# Patient Record
Sex: MALE | Race: WHITE | NOT HISPANIC OR LATINO
[De-identification: names, ages, dates, MRNs, and addresses within clinical notes are randomized per-mention and may not be internally consistent; named-entity substitution may affect disease eponyms.]

---

## 2018-10-01 PROBLEM — Z00.00 ENCOUNTER FOR PREVENTIVE HEALTH EXAMINATION: Status: ACTIVE | Noted: 2018-10-01

## 2018-10-09 ENCOUNTER — APPOINTMENT (OUTPATIENT)
Dept: GASTROENTEROLOGY | Facility: CLINIC | Age: 73
End: 2018-10-09
Payer: COMMERCIAL

## 2018-10-09 VITALS
HEIGHT: 62 IN | TEMPERATURE: 98.9 F | SYSTOLIC BLOOD PRESSURE: 117 MMHG | BODY MASS INDEX: 22.45 KG/M2 | DIASTOLIC BLOOD PRESSURE: 73 MMHG | OXYGEN SATURATION: 98 % | HEART RATE: 90 BPM | WEIGHT: 122 LBS | RESPIRATION RATE: 16 BRPM

## 2018-10-09 DIAGNOSIS — Z12.11 ENCOUNTER FOR SCREENING FOR MALIGNANT NEOPLASM OF COLON: ICD-10-CM

## 2018-10-09 DIAGNOSIS — Z82.61 FAMILY HISTORY OF ARTHRITIS: ICD-10-CM

## 2018-10-09 DIAGNOSIS — K63.5 POLYP OF COLON: ICD-10-CM

## 2018-10-09 DIAGNOSIS — Z78.9 OTHER SPECIFIED HEALTH STATUS: ICD-10-CM

## 2018-10-09 DIAGNOSIS — Z86.39 PERSONAL HISTORY OF OTHER ENDOCRINE, NUTRITIONAL AND METABOLIC DISEASE: ICD-10-CM

## 2018-10-09 DIAGNOSIS — Z81.8 FAMILY HISTORY OF OTHER MENTAL AND BEHAVIORAL DISORDERS: ICD-10-CM

## 2018-10-09 PROCEDURE — 99243 OFF/OP CNSLTJ NEW/EST LOW 30: CPT

## 2018-11-21 ENCOUNTER — APPOINTMENT (OUTPATIENT)
Dept: GASTROENTEROLOGY | Facility: AMBULATORY MEDICAL SERVICES | Age: 73
End: 2018-11-21
Payer: COMMERCIAL

## 2018-11-21 PROCEDURE — 45378 DIAGNOSTIC COLONOSCOPY: CPT

## 2019-02-12 ENCOUNTER — APPOINTMENT (OUTPATIENT)
Dept: GASTROENTEROLOGY | Facility: CLINIC | Age: 74
End: 2019-02-12
Payer: COMMERCIAL

## 2019-02-12 VITALS
BODY MASS INDEX: 21.53 KG/M2 | DIASTOLIC BLOOD PRESSURE: 70 MMHG | WEIGHT: 117 LBS | OXYGEN SATURATION: 99 % | SYSTOLIC BLOOD PRESSURE: 100 MMHG | TEMPERATURE: 98.6 F | HEART RATE: 85 BPM | HEIGHT: 62 IN

## 2019-02-12 PROCEDURE — 99213 OFFICE O/P EST LOW 20 MIN: CPT

## 2019-02-12 NOTE — PHYSICAL EXAM
[General Appearance - Alert] : alert [General Appearance - In No Acute Distress] : in no acute distress [Neck Appearance] : the appearance of the neck was normal [Neck Cervical Mass (___cm)] : no neck mass was observed [Jugular Venous Distention Increased] : there was no jugular-venous distention [Thyroid Diffuse Enlargement] : the thyroid was not enlarged [Thyroid Nodule] : there were no palpable thyroid nodules [Auscultation Breath Sounds / Voice Sounds] : lungs were clear to auscultation bilaterally [Heart Rate And Rhythm] : heart rate was normal and rhythm regular [Heart Sounds] : normal S1 and S2 [Heart Sounds Gallop] : no gallops [Murmurs] : no murmurs [Heart Sounds Pericardial Friction Rub] : no pericardial rub [Bowel Sounds] : normal bowel sounds [Abdomen Soft] : soft [Abdomen Tenderness] : non-tender [] : no hepato-splenomegaly [Abdomen Mass (___ Cm)] : no abdominal mass palpated [Cervical Lymph Nodes Enlarged Posterior Bilaterally] : posterior cervical [Cervical Lymph Nodes Enlarged Anterior Bilaterally] : anterior cervical [Supraclavicular Lymph Nodes Enlarged Bilaterally] : supraclavicular [No CVA Tenderness] : no ~M costovertebral angle tenderness [No Spinal Tenderness] : no spinal tenderness

## 2019-02-12 NOTE — HISTORY OF PRESENT ILLNESS
[de-identified] : 74 year old man recently noted to develop anemia. Hist Hgb/Hct came down to 7.8/25.9 and he received an iron infusion. He denies rectal bleeding, melena or hematemesis. Recent colonoscopy in 11/21/18 was negative. He is referred for an EGD.

## 2019-02-23 ENCOUNTER — LABORATORY RESULT (OUTPATIENT)
Age: 74
End: 2019-02-23

## 2019-02-27 ENCOUNTER — APPOINTMENT (OUTPATIENT)
Dept: GASTROENTEROLOGY | Facility: AMBULATORY MEDICAL SERVICES | Age: 74
End: 2019-02-27
Payer: COMMERCIAL

## 2019-02-27 PROCEDURE — 43239 EGD BIOPSY SINGLE/MULTIPLE: CPT

## 2019-03-19 ENCOUNTER — APPOINTMENT (OUTPATIENT)
Dept: GASTROENTEROLOGY | Facility: CLINIC | Age: 74
End: 2019-03-19

## 2019-03-26 ENCOUNTER — MOBILE ON CALL (OUTPATIENT)
Age: 74
End: 2019-03-26

## 2019-04-09 ENCOUNTER — APPOINTMENT (OUTPATIENT)
Dept: GASTROENTEROLOGY | Facility: CLINIC | Age: 74
End: 2019-04-09

## 2019-04-22 ENCOUNTER — INPATIENT (INPATIENT)
Facility: HOSPITAL | Age: 74
LOS: 3 days | Discharge: HOME | DRG: 189 | End: 2019-04-26
Attending: INTERNAL MEDICINE | Admitting: HOSPITALIST
Payer: COMMERCIAL

## 2019-04-22 VITALS
HEART RATE: 84 BPM | OXYGEN SATURATION: 92 % | HEIGHT: 62 IN | WEIGHT: 110.01 LBS | SYSTOLIC BLOOD PRESSURE: 114 MMHG | RESPIRATION RATE: 18 BRPM | DIASTOLIC BLOOD PRESSURE: 60 MMHG

## 2019-04-22 DIAGNOSIS — R50.9 FEVER, UNSPECIFIED: ICD-10-CM

## 2019-04-22 LAB
ALBUMIN SERPL ELPH-MCNC: 2.6 G/DL — LOW (ref 3.3–5)
ALP SERPL-CCNC: 56 U/L — SIGNIFICANT CHANGE UP (ref 40–120)
ALT FLD-CCNC: 10 U/L — SIGNIFICANT CHANGE UP (ref 10–45)
ANION GAP SERPL CALC-SCNC: 12 MMOL/L — SIGNIFICANT CHANGE UP (ref 5–17)
ANION GAP SERPL CALC-SCNC: 13 MMOL/L — SIGNIFICANT CHANGE UP (ref 5–17)
APPEARANCE UR: CLEAR — SIGNIFICANT CHANGE UP
AST SERPL-CCNC: 20 U/L — SIGNIFICANT CHANGE UP (ref 10–40)
BACTERIA # UR AUTO: NEGATIVE — SIGNIFICANT CHANGE UP
BASOPHILS # BLD AUTO: 0 K/UL — SIGNIFICANT CHANGE UP (ref 0–0.2)
BASOPHILS NFR BLD AUTO: 0.4 % — SIGNIFICANT CHANGE UP (ref 0–2)
BILIRUB SERPL-MCNC: 0.2 MG/DL — SIGNIFICANT CHANGE UP (ref 0.2–1.2)
BILIRUB UR-MCNC: NEGATIVE — SIGNIFICANT CHANGE UP
BUN SERPL-MCNC: 11 MG/DL — SIGNIFICANT CHANGE UP (ref 7–23)
BUN SERPL-MCNC: 13 MG/DL — SIGNIFICANT CHANGE UP (ref 7–23)
CALCIUM SERPL-MCNC: 8.3 MG/DL — LOW (ref 8.4–10.5)
CALCIUM SERPL-MCNC: 8.4 MG/DL — SIGNIFICANT CHANGE UP (ref 8.4–10.5)
CHLORIDE SERPL-SCNC: 94 MMOL/L — LOW (ref 96–108)
CHLORIDE SERPL-SCNC: 97 MMOL/L — SIGNIFICANT CHANGE UP (ref 96–108)
CO2 SERPL-SCNC: 23 MMOL/L — SIGNIFICANT CHANGE UP (ref 22–31)
CO2 SERPL-SCNC: 24 MMOL/L — SIGNIFICANT CHANGE UP (ref 22–31)
COLOR SPEC: SIGNIFICANT CHANGE UP
CREAT SERPL-MCNC: 0.7 MG/DL — SIGNIFICANT CHANGE UP (ref 0.5–1.3)
CREAT SERPL-MCNC: 0.77 MG/DL — SIGNIFICANT CHANGE UP (ref 0.5–1.3)
DIFF PNL FLD: NEGATIVE — SIGNIFICANT CHANGE UP
EOSINOPHIL # BLD AUTO: 0 K/UL — SIGNIFICANT CHANGE UP (ref 0–0.5)
EOSINOPHIL NFR BLD AUTO: 0.1 % — SIGNIFICANT CHANGE UP (ref 0–6)
EPI CELLS # UR: 1 /HPF — SIGNIFICANT CHANGE UP
GAS PNL BLDV: SIGNIFICANT CHANGE UP
GLUCOSE SERPL-MCNC: 152 MG/DL — HIGH (ref 70–99)
GLUCOSE SERPL-MCNC: 266 MG/DL — HIGH (ref 70–99)
GLUCOSE UR QL: ABNORMAL
HCT VFR BLD CALC: 26.8 % — LOW (ref 39–50)
HGB BLD-MCNC: 8.7 G/DL — LOW (ref 13–17)
HYALINE CASTS # UR AUTO: 0 /LPF — SIGNIFICANT CHANGE UP (ref 0–2)
KETONES UR-MCNC: NEGATIVE — SIGNIFICANT CHANGE UP
LEUKOCYTE ESTERASE UR-ACNC: NEGATIVE — SIGNIFICANT CHANGE UP
LYMPHOCYTES # BLD AUTO: 0.5 K/UL — LOW (ref 1–3.3)
LYMPHOCYTES # BLD AUTO: 4.6 % — LOW (ref 13–44)
MAGNESIUM SERPL-MCNC: 1.6 MG/DL — SIGNIFICANT CHANGE UP (ref 1.6–2.6)
MCHC RBC-ENTMCNC: 26.2 PG — LOW (ref 27–34)
MCHC RBC-ENTMCNC: 32.5 GM/DL — SIGNIFICANT CHANGE UP (ref 32–36)
MCV RBC AUTO: 80.5 FL — SIGNIFICANT CHANGE UP (ref 80–100)
MONOCYTES # BLD AUTO: 0.9 K/UL — SIGNIFICANT CHANGE UP (ref 0–0.9)
MONOCYTES NFR BLD AUTO: 8.4 % — SIGNIFICANT CHANGE UP (ref 2–14)
NEUTROPHILS # BLD AUTO: 9 K/UL — HIGH (ref 1.8–7.4)
NEUTROPHILS NFR BLD AUTO: 86.6 % — HIGH (ref 43–77)
NITRITE UR-MCNC: NEGATIVE — SIGNIFICANT CHANGE UP
PH UR: 6 — SIGNIFICANT CHANGE UP (ref 5–8)
PHOSPHATE SERPL-MCNC: 3.8 MG/DL — SIGNIFICANT CHANGE UP (ref 2.5–4.5)
PLATELET # BLD AUTO: 428 K/UL — HIGH (ref 150–400)
POTASSIUM SERPL-MCNC: 2.9 MMOL/L — CRITICAL LOW (ref 3.5–5.3)
POTASSIUM SERPL-MCNC: 3.6 MMOL/L — SIGNIFICANT CHANGE UP (ref 3.5–5.3)
POTASSIUM SERPL-SCNC: 2.9 MMOL/L — CRITICAL LOW (ref 3.5–5.3)
POTASSIUM SERPL-SCNC: 3.6 MMOL/L — SIGNIFICANT CHANGE UP (ref 3.5–5.3)
PROT SERPL-MCNC: 6.6 G/DL — SIGNIFICANT CHANGE UP (ref 6–8.3)
PROT UR-MCNC: ABNORMAL
RBC # BLD: 3.33 M/UL — LOW (ref 4.2–5.8)
RBC # FLD: 17 % — HIGH (ref 10.3–14.5)
RBC CASTS # UR COMP ASSIST: 2 /HPF — SIGNIFICANT CHANGE UP (ref 0–4)
SODIUM SERPL-SCNC: 130 MMOL/L — LOW (ref 135–145)
SODIUM SERPL-SCNC: 133 MMOL/L — LOW (ref 135–145)
SP GR SPEC: 1.01 — SIGNIFICANT CHANGE UP (ref 1.01–1.02)
UROBILINOGEN FLD QL: NEGATIVE — SIGNIFICANT CHANGE UP
WBC # BLD: 10.4 K/UL — SIGNIFICANT CHANGE UP (ref 3.8–10.5)
WBC # FLD AUTO: 10.4 K/UL — SIGNIFICANT CHANGE UP (ref 3.8–10.5)
WBC UR QL: 3 /HPF — SIGNIFICANT CHANGE UP (ref 0–5)

## 2019-04-22 PROCEDURE — 99234 HOSP IP/OBS SM DT SF/LOW 45: CPT

## 2019-04-22 PROCEDURE — 93010 ELECTROCARDIOGRAM REPORT: CPT

## 2019-04-22 RX ORDER — DEXTROSE 50 % IN WATER 50 %
50 SYRINGE (ML) INTRAVENOUS ONCE
Qty: 0 | Refills: 0 | Status: COMPLETED | OUTPATIENT
Start: 2019-04-22 | End: 2019-04-22

## 2019-04-22 RX ORDER — POTASSIUM CHLORIDE 20 MEQ
10 PACKET (EA) ORAL ONCE
Qty: 0 | Refills: 0 | Status: COMPLETED | OUTPATIENT
Start: 2019-04-22 | End: 2019-04-22

## 2019-04-22 RX ORDER — AMIKACIN SULFATE 250 MG/ML
750 INJECTION, SOLUTION INTRAMUSCULAR; INTRAVENOUS ONCE
Qty: 0 | Refills: 0 | Status: COMPLETED | OUTPATIENT
Start: 2019-04-22 | End: 2019-04-22

## 2019-04-22 RX ORDER — DEXTROSE 50 % IN WATER 50 %
25 SYRINGE (ML) INTRAVENOUS ONCE
Qty: 0 | Refills: 0 | Status: DISCONTINUED | OUTPATIENT
Start: 2019-04-22 | End: 2019-04-26

## 2019-04-22 RX ORDER — DEXTROSE 50 % IN WATER 50 %
15 SYRINGE (ML) INTRAVENOUS ONCE
Qty: 0 | Refills: 0 | Status: DISCONTINUED | OUTPATIENT
Start: 2019-04-22 | End: 2019-04-26

## 2019-04-22 RX ORDER — DEXTROSE 50 % IN WATER 50 %
12.5 SYRINGE (ML) INTRAVENOUS ONCE
Qty: 0 | Refills: 0 | Status: DISCONTINUED | OUTPATIENT
Start: 2019-04-22 | End: 2019-04-26

## 2019-04-22 RX ORDER — SODIUM CHLORIDE 9 MG/ML
1000 INJECTION, SOLUTION INTRAVENOUS
Qty: 0 | Refills: 0 | Status: DISCONTINUED | OUTPATIENT
Start: 2019-04-22 | End: 2019-04-22

## 2019-04-22 RX ORDER — SODIUM CHLORIDE 9 MG/ML
1000 INJECTION, SOLUTION INTRAVENOUS
Qty: 0 | Refills: 0 | Status: DISCONTINUED | OUTPATIENT
Start: 2019-04-22 | End: 2019-04-26

## 2019-04-22 RX ORDER — POTASSIUM CHLORIDE 20 MEQ
40 PACKET (EA) ORAL ONCE
Qty: 0 | Refills: 0 | Status: COMPLETED | OUTPATIENT
Start: 2019-04-22 | End: 2019-04-22

## 2019-04-22 RX ORDER — GLUCAGON INJECTION, SOLUTION 0.5 MG/.1ML
1 INJECTION, SOLUTION SUBCUTANEOUS ONCE
Qty: 0 | Refills: 0 | Status: DISCONTINUED | OUTPATIENT
Start: 2019-04-22 | End: 2019-04-26

## 2019-04-22 RX ORDER — ERTAPENEM SODIUM 1 G/1
1000 INJECTION, POWDER, LYOPHILIZED, FOR SOLUTION INTRAMUSCULAR; INTRAVENOUS ONCE
Qty: 0 | Refills: 0 | Status: COMPLETED | OUTPATIENT
Start: 2019-04-22 | End: 2019-04-22

## 2019-04-22 RX ORDER — ETHAMBUTOL HYDROCHLORIDE 400 MG/1
400 TABLET, FILM COATED ORAL ONCE
Qty: 0 | Refills: 0 | Status: COMPLETED | OUTPATIENT
Start: 2019-04-22 | End: 2019-04-22

## 2019-04-22 RX ORDER — SODIUM CHLORIDE 9 MG/ML
3 INJECTION INTRAMUSCULAR; INTRAVENOUS; SUBCUTANEOUS EVERY 8 HOURS
Qty: 0 | Refills: 0 | Status: DISCONTINUED | OUTPATIENT
Start: 2019-04-22 | End: 2019-04-26

## 2019-04-22 RX ORDER — INSULIN GLARGINE 100 [IU]/ML
13 INJECTION, SOLUTION SUBCUTANEOUS AT BEDTIME
Qty: 0 | Refills: 0 | Status: DISCONTINUED | OUTPATIENT
Start: 2019-04-22 | End: 2019-04-24

## 2019-04-22 RX ORDER — ACETAMINOPHEN 500 MG
975 TABLET ORAL ONCE
Qty: 0 | Refills: 0 | Status: COMPLETED | OUTPATIENT
Start: 2019-04-22 | End: 2019-04-22

## 2019-04-22 RX ADMIN — Medication 50 MILLILITER(S): at 04:36

## 2019-04-22 RX ADMIN — INSULIN GLARGINE 13 UNIT(S): 100 INJECTION, SOLUTION SUBCUTANEOUS at 22:51

## 2019-04-22 RX ADMIN — Medication 975 MILLIGRAM(S): at 20:58

## 2019-04-22 RX ADMIN — Medication 100 MILLIEQUIVALENT(S): at 05:24

## 2019-04-22 RX ADMIN — Medication 40 MILLIEQUIVALENT(S): at 05:25

## 2019-04-22 RX ADMIN — AMIKACIN SULFATE 253 MILLIGRAM(S): 250 INJECTION, SOLUTION INTRAMUSCULAR; INTRAVENOUS at 14:32

## 2019-04-22 RX ADMIN — ERTAPENEM SODIUM 120 MILLIGRAM(S): 1 INJECTION, POWDER, LYOPHILIZED, FOR SOLUTION INTRAMUSCULAR; INTRAVENOUS at 15:50

## 2019-04-22 RX ADMIN — SODIUM CHLORIDE 3 MILLILITER(S): 9 INJECTION INTRAMUSCULAR; INTRAVENOUS; SUBCUTANEOUS at 16:04

## 2019-04-22 RX ADMIN — Medication 50 MILLILITER(S): at 06:17

## 2019-04-22 RX ADMIN — SODIUM CHLORIDE 3 MILLILITER(S): 9 INJECTION INTRAMUSCULAR; INTRAVENOUS; SUBCUTANEOUS at 22:33

## 2019-04-22 RX ADMIN — SODIUM CHLORIDE 100 MILLILITER(S): 9 INJECTION, SOLUTION INTRAVENOUS at 04:37

## 2019-04-22 RX ADMIN — ETHAMBUTOL HYDROCHLORIDE 400 MILLIGRAM(S): 400 TABLET, FILM COATED ORAL at 14:50

## 2019-04-22 RX ADMIN — SODIUM CHLORIDE 100 MILLILITER(S): 9 INJECTION, SOLUTION INTRAVENOUS at 05:50

## 2019-04-22 RX ADMIN — Medication 975 MILLIGRAM(S): at 20:20

## 2019-04-22 NOTE — ED CDU PROVIDER INITIAL DAY NOTE - MEDICAL DECISION MAKING DETAILS
73 y/o male with PMHx of MAC, DM, COPD, presenting to the ED with hypoglycemia, which corrected during observation period in the ED after multiple episodes of hypoglycemia. Will observe in CDU for POC fingerstick glucose to ensure euglycemia, Endocrinology evaluation, frequent reassessments.  ATTG: Dr. Sargent

## 2019-04-22 NOTE — ED PROVIDER NOTE - NS ED ROS FT
ROS: denies HA, dizziness, fevers/chills, nausea/vomiting, chest pain, SOB, diaphoresis, abdominal pain, diarrhea, joint pain, neuro deficits, dysuria/hematuria, rash    +decreased appetite, weakness ROS: denies HA, dizziness, fevers/chills, nausea/vomiting, chest pain, SOB, diaphoresis, abdominal pain, diarrhea, joint pain, neuro deficits, dysuria/hematuria, rash    +decreased appetite, weakness    All other symptoms negative

## 2019-04-22 NOTE — ED ADULT NURSE REASSESSMENT NOTE - NS ED NURSE REASSESS COMMENT FT1
12.00 Noon  Received pt from SUZANNE Wolf   pt is observed for Hypoglycemia.  received pt alert and responsive, oriented x4, denies any respiratory distress  ever chills CP SOB Headache N/V/D ..  IV in place, patent and free of signs of infiltration, pt has PICC line Too   V/S stable, pt afebrile, pt denies pain at this time. Pt educated on unit and unit rules, instructed patient to notify RN of any needed assistance, Pt verbalizes understanding, Call bell placed within reach. Safety maintained. Will continue to monitor.
18.00 Spo2 87% on RA  pt is placed on 2 LT od SPO2 NC
MICU at the bedside at this time.
Pt received from SUZANNE Dumas. Pt oriented to CDU & plan of care was discussed. Pt endorses feeling tired. Pt denies any SOB or any difficulty breathing. No use of accessory muscles noted. Pt lung sounds clear on examination. Pt maintained on pulse ox sating ~88% on room air. Pt placed back on 2L NC sating >97%. Pt aware of S&S of hypoglycemia and will notify RN or PA of any symptoms. Safety & comfort measures maintained. Call bell in reach. Will continue to monitor.

## 2019-04-22 NOTE — ED PROVIDER NOTE - PHYSICAL EXAMINATION
Gen: NAD, cachectic male  Head: NCAT  HEENT: PERRL, MMM, normal conjunctiva, anicteric, neck supple  Lung: CTAB, no adventitious sounds  CV: RRR, no murmurs, rubs or gallops  Abd: soft, NTND, no rebound or guarding, no CVAT  MSK: No edema, no visible deformities  Neuro: No focal neurologic deficits. CN II-XII grossly intact. 5/5 strength and normal sensation in all extremities.  Skin: Warm and dry, no evidence of rash  Psych: normal mood and affect

## 2019-04-22 NOTE — ED CDU PROVIDER DISPOSITION NOTE - ATTENDING CONTRIBUTION TO CARE
ED attending Dr Aneudy Ventura note:  Patient re-evaluated and doing well.  No acute issues at  this time.  Lab and radiology tests reviewed with patient and/or family.    I have personally performed a face to face diagnostic evaluation on this patient.  I have reviewed the ACP note and agree with the history, exam, and plan of care, except as noted.  History and Exam by me showing continual desats off o2 with films showing likely continual pna secondary to clint, id saw patient, not contagious but no consult note seen on meds, to admit for resp complaints. pt initially seen by micu while in the ed for continual hypoglycemia on d10 drip which was stopped and stabilized.

## 2019-04-22 NOTE — ED PROVIDER NOTE - CLINICAL SUMMARY MEDICAL DECISION MAKING FREE TEXT BOX
Melissa Moreno MD - Attending Physician: Pt here with hypoglycemia, unclear cause. On oral hypoglycemics, denies increased dose. FSG low despite recent D50 by EMS. Will give D50 now, start D5gtt, frequent FSG, labs, Xr, Ua for source

## 2019-04-22 NOTE — ED CDU PROVIDER INITIAL DAY NOTE - OBJECTIVE STATEMENT
73yo M with hx of MAC, COPD, diabetes (tresiba, victoza, metformin) presents with hypoglycemia. at 9pm, EMS was called for unresponsiveness, pt improved with D50 after FSG read "low", EMS left as patient declined transport. Was called again at 3am for same symptoms and his FS was "low" on repeat. Received D50 approx 20-30min ago with improvement of Glu to 189. On arrival FSG 59. Here, he is mentating well but continues to have dropping FS. Denies taking more pills than expected. Recent hospitalizations for MAC. Reports some poor po. No new fevers. No diff breathing, chest pain, decreased urination  PMD from [facility] Daphne Barrett    In the ED VSS.  Patient was given D50 x 2 with recurrent episodes of hypoglycemia.  Started on a D10 drip with improvement.  FS now ~150s off D10.  Plan to place in the CDu for q4h FS and endocrine consult.

## 2019-04-22 NOTE — ED PROVIDER NOTE - CARE PLAN
Principal Discharge DX:	Hypoglycemia Principal Discharge DX:	Hypoglycemia  Secondary Diagnosis:	Type 2 diabetes mellitus with complication, without long-term current use of insulin  Secondary Diagnosis:	Mycobacterium avium-intracellulare complex

## 2019-04-22 NOTE — ED CDU PROVIDER INITIAL DAY NOTE - PMH
Mycobacterium avium-intracellulare complex    Type 2 diabetes mellitus with complication, without long-term current use of insulin

## 2019-04-22 NOTE — ED PROVIDER NOTE - OBJECTIVE STATEMENT
75yo M with hx of diabetes (tresiba, victoza, metformin) presents with hypoglycemia. at 9pm, EMS was called for unresponsiveness, pt improved with D50, EMS left. Was called again at 3am for same symptoms and his FS was 50s both times. Here, he is mentating well but continues to have dropping FS. Deneis taking more pills. Recent hospitalizations for MAC. 75yo M with hx of diabetes (tresiba, victoza, metformin) presents with hypoglycemia. at 9pm, EMS was called for unresponsiveness, pt improved with D50, EMS left. Was called again at 3am for same symptoms and his FS was 50s both times. Here, he is mentating well but continues to have dropping FS. Deneis taking more pills. Recent hospitalizations for MAC.  [facility] 73[facility]x of diabetes (tresiba, victoza, metformin) presents with hypoglycemia. at 9pm, EMS was called for unresponsiveness, pt improved with D50, EMS left. Was called again at 3am for same symptoms and his FS was 50s both times. Here, he is mentating well but continues to have dropping FS. Deneis taking more pills. Recent hospitalizations for MAC.  PMD from [facility] Dr. uFnmi Barrett 73yo M with hx of diabetes (tresiba, victoza, metformin) presents with hypoglycemia. at 9pm, EMS was called for unresponsiveness, pt improved with D50 after FSG read "low", EMS left as patient declined transport. Was called again at 3am for same symptoms and his FS was "low" on repeat. Received D50 approx 20-30min ago with improvement of Glu to 189. On arrival FSG 59. Here, he is mentating well but continues to have dropping FS. Deneis taking more pills than expected. Recent hospitalizations for MAC. Reports some poor po. No new fevers. No diff breathing, chest pain, decreased urination  PMD from [facility] Dr. Funmi Barrett

## 2019-04-22 NOTE — ED ADULT NURSE NOTE - ED STAT RN HANDOFF DETAILS
Patient  is  transferred   to  CDU.  Report  is  given  .  Condition  is  stable.  Po  food  is  given  and  well tolerated.

## 2019-04-22 NOTE — ED ADULT NURSE REASSESSMENT NOTE - GENERAL PATIENT STATE
improvement verbalized/resting/sleeping/comfortable appearance/cooperative
improvement verbalized/family/SO at bedside/resting/sleeping/comfortable appearance/cooperative
cooperative/resting/sleeping/comfortable appearance

## 2019-04-22 NOTE — ED PROVIDER NOTE - SECONDARY DIAGNOSIS.
Type 2 diabetes mellitus with complication, without long-term current use of insulin Mycobacterium avium-intracellulare complex

## 2019-04-22 NOTE — ED CDU PROVIDER INITIAL DAY NOTE - PROGRESS NOTE DETAILS
Patient seen at bedside in NAD.  VSS.  Patient resting comfortably without complaints. FS stable.  Discussed case with endocrine, Dr. Jacobs, recommending decreased tresiba dose from 20u to 16u.  Spoke with patient's PMD/pulm, Dr. Conklin, who agrees with plan for observation.  Patient with endocrinology follow up on thurday.  -Tyree Parra PA-C CDU PROGRESS NOTE JANKI MICHELLE: Received pt at 1900 sign-out. Pt resting in stretcher in NAD. Case/plan reviewed. Vitals sig for Temp 102F ,O2 sat 88% on RA, /72, HR 78. Pt AOX3 Ambulatory around unit with steady gait. S1 S2 noted, RRR, lungs CTA b/l, BS x4 with soft, nontender abdomen. Pt reports having chills. Patient given Tylenol 975mg po and placed on 2 liters O2 via NC with saturation improving to 97%. c/d/w Dr. Ventura and Admitting team, Medicine admission placed. CDU PROGRESS NOTE PA VIVIANA: Temp 100.1F, patient saturating 98% on 2 liters supplemental O2. Patient without complaints, will continue to monitor. RVP and cultures pending.

## 2019-04-22 NOTE — ED ADULT NURSE REASSESSMENT NOTE - COMFORT CARE
po fluids offered/plan of care explained/meal provided
side rails up/treatment delay explained/wait time explained/ambulated to bathroom/plan of care explained/warm blanket provided/meal provided/darkened lights
plan of care explained/warm blanket provided/side rails up/treatment delay explained/wait time explained/darkened lights

## 2019-04-22 NOTE — CHART NOTE - NSCHARTNOTEFT_GEN_A_CORE
Endocrine consult was called for hypoglycemia but as noted in the MICU attg note, the patient went low due to decreased PO intake, thus a consult from endocrine is not needed, nor is a work-up for insulinoma. Can decrease his tresiba to 16 units sq qam and have him f/u with his outside endocrinologist upon discharge.   Discussed with JANKI Clements.  Kassi Jacobs MD  [phone]

## 2019-04-22 NOTE — ED PROVIDER NOTE - ATTENDING CONTRIBUTION TO CARE
Melissa Moreno MD - Attending Physician: I have personally seen and examined this patient with the resident/fellow.  I have fully participated in the care of this patient. I have reviewed all pertinent clinical information, including history, physical exam, plan and the Resident/Fellow’s note and agree except as noted. See MDM

## 2019-04-22 NOTE — ED PROVIDER NOTE - MDM PATIENT STATEMENT FOR ADDL TREATMENT
bilateral upper extremity ROM was WFL (within functional limits)/bilateral lower extremity ROM was WFL (within functional limits) Patient with one or more new problems requiring additional work-up/treatment.

## 2019-04-22 NOTE — ED ADULT NURSE NOTE - NSIMPLEMENTINTERV_GEN_ALL_ED
Implemented All Fall Risk Interventions:  [place] to call system. Call bell, personal items and telephone within reach. Instruct patient to call for assistance. Room bathroom lighting operational. Non-slip footwear when patient is off stretcher. Physically safe environment: no spills, clutter or unnecessary equipment. Stretcher in lowest position, wheels locked, appropriate side rails in place. Provide visual cue, wrist band, yellow gown, etc. Monitor gait and stability. Monitor for mental status changes and reorient to person, place, and time. Review medications for side effects contributing to fall risk. Reinforce activity limits and safety measures with patient and family.

## 2019-04-22 NOTE — ED PROVIDER NOTE - PROGRESS NOTE DETAILS
MICU called, will see patient as pt FS unstable, continues to drop despite D10, D50 boluses. MICU resident seen at bedside, will dispo per MICU. BG dropping despite initial D50. On D5 gtt. K low. Repleting now. Continue [id number] FSG until stable BG BG significantly lower despite D5 and po. Will place on D10, repeat FSG in 15 min, if still low will c/s MICU

## 2019-04-22 NOTE — CONSULT NOTE ADULT - ASSESSMENT
74M h/o TB, T2DM BIBA s/p hypoglycemic event, remained persistently hypoglycemic requiring multiple pushes of D50 and starting on D10 gtt.     #Hypoglycemia  Possibly in setting of combined diabetes medications (Tresiba- basal insulin, combined with Victoza in AM, and lack of lunch) but also consider infectious etiology.  - recommend sending blood cultures. Pt with known history of MAC infection as well, continue IV abx as per discharge from recent [facility] admission.   - hold insulin and oral diabetes medications for now.  - FS now improved to 140-->117 with D10 drip and additional D50 bolus.   - Continue frequent FS monitoring. If FS glucose drops further consider increasing D10 drip to 120 cc/hr.     Does not require ICU care at this time however re-consult if persistent hypoglycemia.      Radha Collier MD PGY2  MICU Resident  #[id number] 74M h/o TB, T2DM BIBA s/p hypoglycemic event, remained persistently hypoglycemic requiring multiple pushes of D50 and starting on D10 gtt.     #Hypoglycemia  Possibly in setting of combined diabetes medications (Tresiba- basal insulin, combined with Victoza in AM, and lack of lunch) but also consider infectious etiology.  - recommend sending blood cultures. Pt with known history of MAC infection as well, continue IV abx as per discharge from recent [facility] admission.   - hold insulin and oral diabetes medications for now.  - FS now improved to 140-->117 with D10 drip and additional D50 bolus.   - Continue frequent FS monitoring. If FS glucose drops further consider increasing D10 drip to 120 cc/hr.   - check c-peptide, TSH, A1c, CT A/P (consider insulinoma)  - consult endocrinology      Does not require ICU care at this time however re-consult if persistent hypoglycemia.      Radha Collier MD PGY2  MICU Resident  #[id number]

## 2019-04-22 NOTE — CONSULT NOTE ADULT - SUBJECTIVE AND OBJECTIVE BOX
CHIEF COMPLAINT:     HPI: 74M h/o T2DM BIBA s/p hypoglycemic event. Yesterday evening around 9 pm, EMS was called for unresponsiveness, upon arrival pt was found to be  hypoglycemic w/ BG in 50s. He was given 1amp of D50 and he improved. EMS left at that time. EMS was called again around 3AM for same symptoms. He  was again found to be hypoglycemic w/ BG in 50s. Pt was given another amp of D50 and brought into ED.    Pt denies taking any additional meds at home or increasing his dosage of current meds. Pt skipped lunch yesterday because he fell asleep while taking an abx infusion through his LUE PICC line. Of note, was recently hospitalized from   -  for MAC, PICC line in arm discharge on abx (Amikacin, Ertapenem, Ethambutol, Levaquin, Rifampin). For DM he takes tresiba, victoza, and  metformin. He takes tresiba and victoza in AM, reports usual dosage yesterday. Denies history of prior hypoglycemia.     In ED, he was afebrile, HR 60-80s, /60, RR 18, satting well on room air, he was speaking full sentences, AAO x 2-3. BG was low, required another D50  and pt was started on D10 + NS + KCl gtt at 100cc/hr. Despite gtt, his BG continued to be low (57). On D10 drip as well as additional D50 IV push, FS glucose improved to 140. Pt mentating well, denies other symptoms. States he is trying to eat but dislikes hospital food.       FAMILY HISTORY:  None known.     SOCIAL HISTORY:  Pt , lives with wife.  States currently working as a .     Allergies    No Known Allergies    Intolerances        HOME MEDICATIONS:    REVIEW OF SYSTEMS:  Constitutional: No reported fever or chills.   Eyes: No visual changes  ENT: No sore throat  CV: No chest pain, palpitations, or leg swelling  Resp: +Intermittent cough with productive sputum, ongoing. No wheezing.   GI: No nausea, vomiting, abdominal pain, or diarrhea.   : No dysuria.   Integumentary: has PICC line in left arm   Neurological: no focal weaknesses  Psychiatric: no reported confusion    [ ] All other systems negative  [ ] Unable to assess ROS because ________    OBJECTIVE:  ICU Vital Signs Last 24 Hrs  T(C): 36.8 (2019 06:17), Max: 37 (2019 04:28)  T(F): 98.3 (2019 06:17), Max: 98.6 (2019 04:28)  HR: 63 (2019 06:17) (63 - 84)  BP: 112/57 (2019 06:17) (97/53 - 114/60)  BP(mean): --  ABP: --  ABP(mean): --  RR: 18 (2019 06:17) (16 - 19)  SpO2: 100% (2019 06:17) (92% - 100%)        CAPILLARY BLOOD GLUCOSE      POCT Blood Glucose.: 117 mg/dL (2019 07:28)      PHYSICAL EXAM:  General: elderly gentleman in no acute distress, on nasal cannula  Respiratory: lungs clear to auscultation bilaterally; no wheezing, rales or rhonchi   Cardiovascular: regular rate and rhythm; no murmurs.   Abdomen: soft, nontender, nondistended, normal bowel sounds  Extremities: nonedematous  Skin: LUE PICC line; no rashes or lesions  Neurological: no focal neuro deficits  Psychiatry: A&O x 2-3, slightly confused, does not know all events that happened but is conversant, speaks in full sentences    HOSPITAL MEDICATIONS:  MEDICATIONS  (STANDING):  dextrose 10% + sodium chloride 0.9% 1000 milliLiter(s) (100 mL/Hr) IV Continuous <Continuous>    MEDICATIONS  (PRN):      LABS:                        8.7    10.4  )-----------( 428      ( 2019 04:28 )             26.8     04-22    133<L>  |  97  |  11  ----------------------------<  152<H>  3.6   |  23  |  0.70    Ca    8.4      2019 06:46  Phos  3.8     04-22  Mg     1.6     -    TPro  6.6  /  Alb  2.6<L>  /  TBili  0.2  /  DBili  x   /  AST  20  /  ALT  10  /  AlkPhos  56  04-22      Urinalysis Basic - ( 2019 06:58 )    Color: Light Yellow / Appearance: Clear / S.014 / pH: x  Gluc: x / Ketone: Negative  / Bili: Negative / Urobili: Negative   Blood: x / Protein: 30 mg/dL / Nitrite: Negative   Leuk Esterase: Negative / RBC: x / WBC x   Sq Epi: x / Non Sq Epi: x / Bacteria: x        Venous Blood Gas:   @ 04:28  7.50/32/63/25/94  VBG Lactate: 2.0      MICROBIOLOGY:     RADIOLOGY:  [ ] Reviewed and interpreted by me    EKG:

## 2019-04-22 NOTE — ED ADULT NURSE NOTE - OBJECTIVE STATEMENT
74y male with hx of tuberculosis, DM BIBEMS s/p hypoglycemic event. pt is alert and oriented x 4 and speaking coherently. As per EMS they were called to the home earlier tonight for an unresponsive episode. pt was given D50 and regained consciousness. EMS left home per MD over the phone. EMS was called back to home at 3am, and had another episode of being unresponsive. pt BG read a Lo on machine. pt  s/p amp of d50, Pt FS 59 @ [facility]. pt is alert and coherently speaking at this time. pt denies cp, sob, n/v, fevers. md truong completed. will reassess.

## 2019-04-22 NOTE — ED CDU PROVIDER DISPOSITION NOTE - CLINICAL COURSE
74M h/o T2DM BIBA s/p hypoglycemic event. Yesterday evening around 9 pm, EMS was called for unresponsiveness, upon arrival pt was found to be  hypoglycemic w/ BG in 50s. He was given 1amp of D50 and he improved. EMS left at that time. EMS was called again around 3AM for same symptoms. He  was again found to be hypoglycemic w/ BG in 50s. Pt was given another amp of D50 and brought into ED.  Pt denies taking any additional meds at home or increasing his dosage of current meds. Pt skipped lunch yesterday because he fell asleep while taking an abx infusion through his LUE PICC line. Of note, was recently hospitalized from 4/9  - 4/17 for MAC, PICC line in arm discharge on abx (Amikacin, Ertapenem, Ethambutol, Levaquin, Rifampin). For DM he takes tresiba, victoza, and  metformin. He takes tresiba and victoza in AM, reports usual dosage yesterday. Denies history of prior hypoglycemia.   In ED, he was afebrile, HR 60-80s, /60, RR 18, satting well on room air, he was speaking full sentences, AAO x 2-3. BG was low, required another D50  and pt was started on D10 + NS + KCl gtt at 100cc/hr. Despite gtt, his BG continued to be low (57). On D10 drip as well as additional D50 IV push, FS glucose improved to 140. Pt was evaluated by MICU but not accepted and sent to CDU for Hypoglycemia management/monitoring. In CDU, patient became hypoxic  to 88% on RA and febrile to 102F despite receiving antibiotic for MAC. Patient given Tylenol 975mg po and placed on 2 liters O2 via NC with saturation improving to 97%. c/d/w Dr. Ventura and Admitting team, Medicine admission placed.

## 2019-04-23 ENCOUNTER — TRANSCRIPTION ENCOUNTER (OUTPATIENT)
Age: 74
End: 2019-04-23

## 2019-04-23 DIAGNOSIS — Z79.899 OTHER LONG TERM (CURRENT) DRUG THERAPY: ICD-10-CM

## 2019-04-23 DIAGNOSIS — E16.2 HYPOGLYCEMIA, UNSPECIFIED: ICD-10-CM

## 2019-04-23 DIAGNOSIS — J96.01 ACUTE RESPIRATORY FAILURE WITH HYPOXIA: ICD-10-CM

## 2019-04-23 DIAGNOSIS — B34.0 ADENOVIRUS INFECTION, UNSPECIFIED: ICD-10-CM

## 2019-04-23 DIAGNOSIS — Z29.9 ENCOUNTER FOR PROPHYLACTIC MEASURES, UNSPECIFIED: ICD-10-CM

## 2019-04-23 DIAGNOSIS — Z71.89 OTHER SPECIFIED COUNSELING: ICD-10-CM

## 2019-04-23 DIAGNOSIS — A31.0 PULMONARY MYCOBACTERIAL INFECTION: ICD-10-CM

## 2019-04-23 DIAGNOSIS — E11.9 TYPE 2 DIABETES MELLITUS WITHOUT COMPLICATIONS: ICD-10-CM

## 2019-04-23 LAB
ANION GAP SERPL CALC-SCNC: 12 MMOL/L — SIGNIFICANT CHANGE UP (ref 5–17)
BLD GP AB SCN SERPL QL: NEGATIVE — SIGNIFICANT CHANGE UP
BUN SERPL-MCNC: 8 MG/DL — SIGNIFICANT CHANGE UP (ref 7–23)
CALCIUM SERPL-MCNC: 8.6 MG/DL — SIGNIFICANT CHANGE UP (ref 8.4–10.5)
CHLORIDE SERPL-SCNC: 95 MMOL/L — LOW (ref 96–108)
CO2 SERPL-SCNC: 24 MMOL/L — SIGNIFICANT CHANGE UP (ref 22–31)
CREAT SERPL-MCNC: 0.71 MG/DL — SIGNIFICANT CHANGE UP (ref 0.5–1.3)
FERRITIN SERPL-MCNC: 1184 NG/ML — HIGH (ref 30–400)
GLUCOSE BLDC GLUCOMTR-MCNC: 126 MG/DL — HIGH (ref 70–99)
GLUCOSE BLDC GLUCOMTR-MCNC: 140 MG/DL — HIGH (ref 70–99)
GLUCOSE BLDC GLUCOMTR-MCNC: 143 MG/DL — HIGH (ref 70–99)
GLUCOSE BLDC GLUCOMTR-MCNC: 148 MG/DL — HIGH (ref 70–99)
GLUCOSE BLDC GLUCOMTR-MCNC: 165 MG/DL — HIGH (ref 70–99)
GLUCOSE BLDC GLUCOMTR-MCNC: 203 MG/DL — HIGH (ref 70–99)
GLUCOSE SERPL-MCNC: 149 MG/DL — HIGH (ref 70–99)
HADV DNA SPEC QL NAA+PROBE: DETECTED
HCT VFR BLD CALC: 28.5 % — LOW (ref 39–50)
HCV AB S/CO SERPL IA: 0.11 S/CO — SIGNIFICANT CHANGE UP (ref 0–0.99)
HCV AB SERPL-IMP: SIGNIFICANT CHANGE UP
HGB BLD-MCNC: 8.9 G/DL — LOW (ref 13–17)
IRON SATN MFR SERPL: 21 UG/DL — LOW (ref 45–165)
MCHC RBC-ENTMCNC: 25 PG — LOW (ref 27–34)
MCHC RBC-ENTMCNC: 31.2 GM/DL — LOW (ref 32–36)
MCV RBC AUTO: 80.1 FL — SIGNIFICANT CHANGE UP (ref 80–100)
PLATELET # BLD AUTO: 410 K/UL — HIGH (ref 150–400)
POTASSIUM SERPL-MCNC: 3.9 MMOL/L — SIGNIFICANT CHANGE UP (ref 3.5–5.3)
POTASSIUM SERPL-SCNC: 3.9 MMOL/L — SIGNIFICANT CHANGE UP (ref 3.5–5.3)
RAPID RVP RESULT: DETECTED
RBC # BLD: 3.56 M/UL — LOW (ref 4.2–5.8)
RBC # FLD: 17.3 % — HIGH (ref 10.3–14.5)
RH IG SCN BLD-IMP: NEGATIVE — SIGNIFICANT CHANGE UP
SODIUM SERPL-SCNC: 131 MMOL/L — LOW (ref 135–145)
WBC # BLD: 7.48 K/UL — SIGNIFICANT CHANGE UP (ref 3.8–10.5)
WBC # FLD AUTO: 7.48 K/UL — SIGNIFICANT CHANGE UP (ref 3.8–10.5)

## 2019-04-23 PROCEDURE — 99255 IP/OBS CONSLTJ NEW/EST HI 80: CPT | Mod: GC

## 2019-04-23 PROCEDURE — 99223 1ST HOSP IP/OBS HIGH 75: CPT | Mod: GC,25

## 2019-04-23 PROCEDURE — 99497 ADVNCD CARE PLAN 30 MIN: CPT

## 2019-04-23 PROCEDURE — 12345: CPT | Mod: NC,GC

## 2019-04-23 RX ORDER — ERTAPENEM SODIUM 1 G/1
1000 INJECTION, POWDER, LYOPHILIZED, FOR SOLUTION INTRAMUSCULAR; INTRAVENOUS ONCE
Qty: 0 | Refills: 0 | Status: COMPLETED | OUTPATIENT
Start: 2019-04-23 | End: 2019-04-23

## 2019-04-23 RX ORDER — AMIKACIN SULFATE 250 MG/ML
400 INJECTION, SOLUTION INTRAMUSCULAR; INTRAVENOUS
Qty: 0 | Refills: 0 | Status: DISCONTINUED | OUTPATIENT
Start: 2019-04-23 | End: 2019-04-26

## 2019-04-23 RX ORDER — INSULIN LISPRO 100/ML
VIAL (ML) SUBCUTANEOUS AT BEDTIME
Qty: 0 | Refills: 0 | Status: DISCONTINUED | OUTPATIENT
Start: 2019-04-23 | End: 2019-04-24

## 2019-04-23 RX ORDER — ERTAPENEM SODIUM 1 G/1
1000 INJECTION, POWDER, LYOPHILIZED, FOR SOLUTION INTRAMUSCULAR; INTRAVENOUS EVERY 24 HOURS
Qty: 0 | Refills: 0 | Status: DISCONTINUED | OUTPATIENT
Start: 2019-04-24 | End: 2019-04-26

## 2019-04-23 RX ORDER — PIPERACILLIN AND TAZOBACTAM 4; .5 G/20ML; G/20ML
3.38 INJECTION, POWDER, LYOPHILIZED, FOR SOLUTION INTRAVENOUS ONCE
Qty: 0 | Refills: 0 | Status: COMPLETED | OUTPATIENT
Start: 2019-04-23 | End: 2019-04-23

## 2019-04-23 RX ORDER — INSULIN DEGLUDEC 100 U/ML
20 INJECTION, SOLUTION SUBCUTANEOUS
Qty: 0 | Refills: 0 | COMMUNITY

## 2019-04-23 RX ORDER — INSULIN LISPRO 100/ML
VIAL (ML) SUBCUTANEOUS
Qty: 0 | Refills: 0 | Status: DISCONTINUED | OUTPATIENT
Start: 2019-04-23 | End: 2019-04-24

## 2019-04-23 RX ORDER — ETHAMBUTOL HYDROCHLORIDE 400 MG/1
800 TABLET, FILM COATED ORAL DAILY
Qty: 0 | Refills: 0 | Status: DISCONTINUED | OUTPATIENT
Start: 2019-04-23 | End: 2019-04-26

## 2019-04-23 RX ORDER — ERTAPENEM SODIUM 1 G/1
INJECTION, POWDER, LYOPHILIZED, FOR SOLUTION INTRAMUSCULAR; INTRAVENOUS
Qty: 0 | Refills: 0 | Status: DISCONTINUED | OUTPATIENT
Start: 2019-04-23 | End: 2019-04-26

## 2019-04-23 RX ORDER — FLUTICASONE FUROATE, UMECLIDINIUM BROMIDE AND VILANTEROL TRIFENATATE 200; 62.5; 25 UG/1; UG/1; UG/1
1 POWDER RESPIRATORY (INHALATION)
Qty: 0 | Refills: 0 | COMMUNITY

## 2019-04-23 RX ORDER — PIPERACILLIN AND TAZOBACTAM 4; .5 G/20ML; G/20ML
3.38 INJECTION, POWDER, LYOPHILIZED, FOR SOLUTION INTRAVENOUS EVERY 8 HOURS
Qty: 0 | Refills: 0 | Status: DISCONTINUED | OUTPATIENT
Start: 2019-04-23 | End: 2019-04-23

## 2019-04-23 RX ORDER — METFORMIN HYDROCHLORIDE 850 MG/1
2 TABLET ORAL
Qty: 0 | Refills: 0 | COMMUNITY

## 2019-04-23 RX ORDER — VANCOMYCIN HCL 1 G
1000 VIAL (EA) INTRAVENOUS ONCE
Qty: 0 | Refills: 0 | Status: COMPLETED | OUTPATIENT
Start: 2019-04-23 | End: 2019-04-23

## 2019-04-23 RX ORDER — VANCOMYCIN HCL 1 G
VIAL (EA) INTRAVENOUS
Qty: 0 | Refills: 0 | Status: DISCONTINUED | OUTPATIENT
Start: 2019-04-23 | End: 2019-04-23

## 2019-04-23 RX ORDER — VANCOMYCIN HCL 1 G
1000 VIAL (EA) INTRAVENOUS EVERY 12 HOURS
Qty: 0 | Refills: 0 | Status: DISCONTINUED | OUTPATIENT
Start: 2019-04-23 | End: 2019-04-23

## 2019-04-23 RX ORDER — HEPARIN SODIUM 5000 [USP'U]/ML
5000 INJECTION INTRAVENOUS; SUBCUTANEOUS EVERY 8 HOURS
Qty: 0 | Refills: 0 | Status: DISCONTINUED | OUTPATIENT
Start: 2019-04-23 | End: 2019-04-26

## 2019-04-23 RX ORDER — ALBUTEROL 90 UG/1
3 AEROSOL, METERED ORAL
Qty: 0 | Refills: 0 | COMMUNITY

## 2019-04-23 RX ADMIN — HEPARIN SODIUM 5000 UNIT(S): 5000 INJECTION INTRAVENOUS; SUBCUTANEOUS at 22:26

## 2019-04-23 RX ADMIN — HEPARIN SODIUM 5000 UNIT(S): 5000 INJECTION INTRAVENOUS; SUBCUTANEOUS at 05:49

## 2019-04-23 RX ADMIN — PIPERACILLIN AND TAZOBACTAM 25 GRAM(S): 4; .5 INJECTION, POWDER, LYOPHILIZED, FOR SOLUTION INTRAVENOUS at 10:16

## 2019-04-23 RX ADMIN — SODIUM CHLORIDE 3 MILLILITER(S): 9 INJECTION INTRAMUSCULAR; INTRAVENOUS; SUBCUTANEOUS at 21:00

## 2019-04-23 RX ADMIN — INSULIN GLARGINE 13 UNIT(S): 100 INJECTION, SOLUTION SUBCUTANEOUS at 22:26

## 2019-04-23 RX ADMIN — HEPARIN SODIUM 5000 UNIT(S): 5000 INJECTION INTRAVENOUS; SUBCUTANEOUS at 13:04

## 2019-04-23 RX ADMIN — SODIUM CHLORIDE 3 MILLILITER(S): 9 INJECTION INTRAMUSCULAR; INTRAVENOUS; SUBCUTANEOUS at 05:25

## 2019-04-23 RX ADMIN — ERTAPENEM SODIUM 120 MILLIGRAM(S): 1 INJECTION, POWDER, LYOPHILIZED, FOR SOLUTION INTRAMUSCULAR; INTRAVENOUS at 16:42

## 2019-04-23 RX ADMIN — SODIUM CHLORIDE 3 MILLILITER(S): 9 INJECTION INTRAMUSCULAR; INTRAVENOUS; SUBCUTANEOUS at 11:26

## 2019-04-23 RX ADMIN — Medication 2: at 13:04

## 2019-04-23 RX ADMIN — PIPERACILLIN AND TAZOBACTAM 200 GRAM(S): 4; .5 INJECTION, POWDER, LYOPHILIZED, FOR SOLUTION INTRAVENOUS at 03:34

## 2019-04-23 RX ADMIN — Medication 250 MILLIGRAM(S): at 04:21

## 2019-04-23 RX ADMIN — ETHAMBUTOL HYDROCHLORIDE 800 MILLIGRAM(S): 400 TABLET, FILM COATED ORAL at 17:42

## 2019-04-23 NOTE — DISCHARGE NOTE PROVIDER - NSFOLLOWUPCLINICS_GEN_ALL_ED_FT
[facility] Endocrinology  Endocrinology  [address] [place] [address]  Phone: [phone]  Fax:   Follow Up Time: 7-10 Days

## 2019-04-23 NOTE — CONSULT NOTE ADULT - ASSESSMENT
75 yo male with a PMH of T2DM (on insulin, victoza and metformin), VILMA (dx 2018, on IV abx via PICC placed 04/19 at [facility] [place]), TB (age 15, txtd with PCN), who presents to hospital after developing hypoglycemia at home. He had been diagnosed with MAC infection in [facility] [place] (bronch done to r/o lung ca and bal cx with VILMA) and discharged on 4/17 with PCC line. He had been administering Amikacin 3 times a week  and ertapenem daily and taking rifampin and ethambutol pills as directed.   He denied increased cough, SOB. Denied diarrhea, dysuria, abdominal pain and fever. Febrile in hospital, Tmax 102 in ED. RVP positive for adenovirus. Chest X ray with patchy opacities (c/w given h/o VILMA) superimposed upon pulmonary emphysema. No leukocytosis.   Spoke to patient's pulmonologist and clarified medications. Patient is on Ertapenem 1g/day, Amikacin 8mg/kg (reduced dose for elevated trough) 3times/week, Rifampin 600 po daily  and Ethambutol 800 Po daily. 73 yo male with a PMH of T2DM (on insulin, Victoza and metformin), VILMA (dx 2018, on IV abx via PICC placed 04/19 at [facility]), who presents to hospital after developing hypoglycemia at home. He had been diagnosed with MAC infection in [facility] (bronch done to r/o lung ca and bal cx with VILMA) and discharged on 4/17 with PICC line. He had been administering Amikacin 3 times a week  and ertapenem daily and taking rifampin and ethambutol pills as directed.   He denied increased cough, SOB. Denied diarrhea, dysuria, abdominal pain and fever. Febrile in hospital, Tmax 102 in ED. RVP positive for adenovirus. Chest X ray with patchy opacities (c/w given h/o VILMA) superimposed upon pulmonary emphysema. No leukocytosis.   Spoke to patient's pulmonologist and clarified medications. Patient is on    Fever - likely from underlying adenovirus infection, Less likely pneumonia   VILMA infection (lungs) in the setting of copd/emphysema        Suggest:  * Restart regimen for VILMA     discussed with pulmonologist: Start:     Ertapenem 1g/day,    Amikacin 8mg/kg  3times/week Mond/Wed/Frid    Rifampin 600 po daily     Ethambutol 800 Po daily    *Unlikely superimposed pneumonia (patient has been on Ertapenem, Amikacin)   D/w vancomycin and zosyn     D/W team

## 2019-04-23 NOTE — PROGRESS NOTE ADULT - SUBJECTIVE AND OBJECTIVE BOX
Patient is a 74y old  Male who presents with a chief complaint of hypoxia and fever (2019 00:07)    Cecy Chamberlain  Internal Medicine PGY-1  Pager # [phone]/ [phone]    SUBJECTIVE / OVERNIGHT EVENTS: Patient seen and examined. No acute overnight events, no subjective complaints.    OBJECTIVE:  Vital Signs Last 24 Hrs  T(C): 37.2 (2019 07:47), Max: 38.9 (2019 20:05)  T(F): 99 (2019 07:47), Max: 102 (2019 20:05)  HR: 85 (2019 07:47) (62 - 85)  BP: 107/50 (2019 07:47) (103/69 - 139/72)  BP(mean): --  RR: 18 (2019 07:47) (17 - 20)  SpO2: 94% (2019 07:47) (88% - 100%)    I&O's Summary    2019 07:01  -  2019 07:00  --------------------------------------------------------  IN: 350 mL / OUT: 150 mL / NET: 200 mL      Physical Examination:  GEN: thin man, laying in stretcher in NAD  PSYCH: A&Ox3, mood and affect appear appropriate   SKIN: intact, no e/o rash  NEURO: no focal neurologic deficits appreciated; CN II-XII intact, sensation intact B/L, motor 5/5 in all mm groups  EYES: PERRL, anicteric, EOMI, no vertical/horizontal nystagmus  HEAD: NC, AT  NECK: supple  RESPI: no accessory muscle use, B/L air entry, CTAB   CARDIO: regular rate/rhythm, no LE edema B/L  ABD: soft, NT, ND, +BS  EXT: patient able to move all extremities spontaneously  VASC: peripheral pulses palpated    Labs:  CAPILLARY BLOOD GLUCOSE      POCT Blood Glucose.: 140 mg/dL (2019 04:05)  POCT Blood Glucose.: 148 mg/dL (2019 00:24)  POCT Blood Glucose.: 147 mg/dL (2019 21:53)  POCT Blood Glucose.: 116 mg/dL (2019 18:01)  POCT Blood Glucose.: 177 mg/dL (2019 16:07)  POCT Blood Glucose.: 152 mg/dL (2019 11:57)  POCT Blood Glucose.: 155 mg/dL (2019 11:18)  POCT Blood Glucose.: 208 mg/dL (2019 09:50)                          8.7    10.4  )-----------( 428      ( 2019 04:28 )             26.8     04-23    131<L>  |  95<L>  |  8   ----------------------------<  149<H>  3.9   |  24  |  0.71    Ca    8.6      2019 06:55  Phos  3.8     -  Mg     1.6         TPro  6.6  /  Alb  2.6<L>  /  TBili  0.2  /  DBili  x   /  AST  20  /  ALT  10  /  AlkPhos  56  22            Urinalysis Basic - ( 2019 06:58 )    Color: Light Yellow / Appearance: Clear / S.014 / pH: x  Gluc: x / Ketone: Negative  / Bili: Negative / Urobili: Negative   Blood: x / Protein: 30 mg/dL / Nitrite: Negative   Leuk Esterase: Negative / RBC: 2 /hpf / WBC 3 /HPF   Sq Epi: x / Non Sq Epi: 1 /hpf / Bacteria: Negative      Imaging Personally Reviewed:    Consultant(s) Notes Reviewed:   Care Discussed with Consultants/Other Providers:    MEDICATIONS  (STANDING):  dextrose 5%. 1000 milliLiter(s) (50 mL/Hr) IV Continuous <Continuous>  dextrose 50% Injectable 12.5 Gram(s) IV Push once  dextrose 50% Injectable 25 Gram(s) IV Push once  dextrose 50% Injectable 25 Gram(s) IV Push once  heparin  Injectable 5000 Unit(s) SubCutaneous every 8 hours  insulin glargine Injectable (LANTUS) 13 Unit(s) SubCutaneous at bedtime  insulin lispro (HumaLOG) corrective regimen sliding scale   SubCutaneous three times a day before meals  insulin lispro (HumaLOG) corrective regimen sliding scale   SubCutaneous at bedtime  piperacillin/tazobactam IVPB. 3.375 Gram(s) IV Intermittent every 8 hours  sodium chloride 0.9% lock flush 3 milliLiter(s) IV Push every 8 hours  vancomycin  IVPB 1000 milliGRAM(s) IV Intermittent every 12 hours  vancomycin  IVPB        MEDICATIONS  (PRN):  dextrose 40% Gel 15 Gram(s) Oral once PRN Blood Glucose LESS THAN 70 milliGRAM(s)/deciliter  glucagon  Injectable 1 milliGRAM(s) IntraMuscular once PRN Glucose LESS THAN 70 milligrams/deciliter Patient is a 74y old  Male who presents with a chief complaint of hypoxia and fever (2019 00:07)    Cecy Chamberlain  Internal Medicine PGY-1  Pager # [phone]/ [phone]    SUBJECTIVE / OVERNIGHT EVENTS: Patient seen and examined. No acute overnight events. Patient denies CP, SOB, or dizziness.     OBJECTIVE:  Vital Signs Last 24 Hrs  T(C): 37.2 (2019 07:47), Max: 38.9 (2019 20:05)  T(F): 99 (2019 07:47), Max: 102 (2019 20:05)  HR: 85 (2019 07:47) (62 - 85)  BP: 107/50 (2019 07:47) (103/69 - 139/72)  BP(mean): --  RR: 18 (2019 07:47) (17 - 20)  SpO2: 94% (2019 07:47) (88% - 100%)    I&O's Summary    2019 07:01  -  2019 07:00  --------------------------------------------------------  IN: 350 mL / OUT: 150 mL / NET: 200 mL      Physical Examination:  GEN: thin man, NAD  PSYCH: A&Ox3, mood and affect appear appropriate   SKIN: intact, no e/o rash  NEURO: no focal neurologic deficits appreciated; CN II-XII intact, sensation intact B/L, motor 5/5 in all mm groups  EYES: PERRL, anicteric, EOMI, no vertical/horizontal nystagmus  HEAD: NC, AT  NECK: supple  RESPI: no accessory muscle use, inspiratory crackles noted b/l-more prominent in lower lobes   CARDIO: S1 and S2, regular rate/rhythm, no LE edema B/L  ABD: soft, NT, ND, +BS  EXT: patient able to move all extremities spontaneously  VASC: peripheral pulses palpated, no LE edema     Labs:  CAPILLARY BLOOD GLUCOSE      POCT Blood Glucose.: 140 mg/dL (2019 04:05)  POCT Blood Glucose.: 148 mg/dL (2019 00:24)  POCT Blood Glucose.: 147 mg/dL (2019 21:53)  POCT Blood Glucose.: 116 mg/dL (2019 18:01)  POCT Blood Glucose.: 177 mg/dL (2019 16:07)  POCT Blood Glucose.: 152 mg/dL (2019 11:57)  POCT Blood Glucose.: 155 mg/dL (2019 11:18)  POCT Blood Glucose.: 208 mg/dL (2019 09:50)                          8.7    10.4  )-----------( 428      ( 2019 04:28 )             26.8     04-23    131<L>  |  95<L>  |  8   ----------------------------<  149<H>  3.9   |  24  |  0.71    Ca    8.6      2019 06:55  Phos  3.8     -  Mg     1.6         TPro  6.6  /  Alb  2.6<L>  /  TBili  0.2  /  DBili  x   /  AST  20  /  ALT  10  /  AlkPhos  56              Urinalysis Basic - ( 2019 06:58 )    Color: Light Yellow / Appearance: Clear / S.014 / pH: x  Gluc: x / Ketone: Negative  / Bili: Negative / Urobili: Negative   Blood: x / Protein: 30 mg/dL / Nitrite: Negative   Leuk Esterase: Negative / RBC: 2 /hpf / WBC 3 /HPF   Sq Epi: x / Non Sq Epi: 1 /hpf / Bacteria: Negative      Imaging Personally Reviewed:    Consultant(s) Notes Reviewed:   Care Discussed with Consultants/Other Providers:    MEDICATIONS  (STANDING):  dextrose 5%. 1000 milliLiter(s) (50 mL/Hr) IV Continuous <Continuous>  dextrose 50% Injectable 12.5 Gram(s) IV Push once  dextrose 50% Injectable 25 Gram(s) IV Push once  dextrose 50% Injectable 25 Gram(s) IV Push once  heparin  Injectable 5000 Unit(s) SubCutaneous every 8 hours  insulin glargine Injectable (LANTUS) 13 Unit(s) SubCutaneous at bedtime  insulin lispro (HumaLOG) corrective regimen sliding scale   SubCutaneous three times a day before meals  insulin lispro (HumaLOG) corrective regimen sliding scale   SubCutaneous at bedtime  piperacillin/tazobactam IVPB. 3.375 Gram(s) IV Intermittent every 8 hours  sodium chloride 0.9% lock flush 3 milliLiter(s) IV Push every 8 hours  vancomycin  IVPB 1000 milliGRAM(s) IV Intermittent every 12 hours  vancomycin  IVPB        MEDICATIONS  (PRN):  dextrose 40% Gel 15 Gram(s) Oral once PRN Blood Glucose LESS THAN 70 milliGRAM(s)/deciliter  glucagon  Injectable 1 milliGRAM(s) IntraMuscular once PRN Glucose LESS THAN 70 milligrams/deciliter

## 2019-04-23 NOTE — PROGRESS NOTE ADULT - PROBLEM SELECTOR PLAN 2
-Dx 2018. On multiple abx in past. Current regiment: Amikacin, Ertapenem, Ethambutol, Levaquin, Rifampin. PICC placed during admission earlier this month at [facility]. Spiked fever in ED to 102 without other SIRS criteria. May be 2/2 to MAC however patient also RVP+ for adenovirus. Will contact patients pulm Dr Katerin MERCHANT to discuss further care (ED spoke to Katerin 4/22)  -bcx sent to r/o bacteremia in setting of fevers and recent PICC line placement  -Please f/u ID in AM  -Please obtain records of culture and sensitivity of MAC so appropriate therapy can be restarted. -Dx 2018. On multiple abx in past. Current regiment: Amikacin, Ertapenem, Ethambutol, Levaquin, Rifampin. PICC placed during admission earlier this month at [facility]. Spiked fever in ED to 102 without other SIRS criteria. May be 2/2 to MAC however patient also RVP+ for adenovirus.   -bcx sent to r/o bacteremia in setting of fevers and recent PICC line placement  -ID evaluating

## 2019-04-23 NOTE — H&P ADULT - NSHPLABSRESULTS_GEN_ALL_CORE
CBC Full  -  ( 2019 04:28 )  WBC Count : 10.4 K/uL  RBC Count : 3.33 M/uL  Hemoglobin : 8.7 g/dL  Hematocrit : 26.8 %  Platelet Count - Automated : 428 K/uL  Mean Cell Volume : 80.5 fl  Mean Cell Hemoglobin : 26.2 pg  Mean Cell Hemoglobin Concentration : 32.5 gm/dL  Auto Neutrophil # : 9.0 K/uL  Auto Lymphocyte # : 0.5 K/uL  Auto Monocyte # : 0.9 K/uL  Auto Eosinophil # : 0.0 K/uL  Auto Basophil # : 0.0 K/uL  Auto Neutrophil % : 86.6 %  Auto Lymphocyte % : 4.6 %  Auto Monocyte % : 8.4 %  Auto Eosinophil % : 0.1 %  Auto Basophil % : 0.4 %        133<L>  |  97  |  11  ----------------------------<  152<H>  3.6   |  23  |  0.70    Ca    8.4      2019 06:46  Phos  3.8       Mg     1.6         LIVER FUNCTIONS - ( 2019 04:28 )  Alb: 2.6 g/dL / Pro: 6.6 g/dL / ALK PHOS: 56 U/L / ALT: 10 U/L / AST: 20 U/L / GGT: x           04:28 - VBG - pH: 7.50  | pCO2: 32    | pO2: 63    | Lactate: 2.0      Urinalysis Basic - ( 2019 06:58 )    Color: Light Yellow / Appearance: Clear / S.014 / pH: x  Gluc: x / Ketone: Negative  / Bili: Negative / Urobili: Negative   Blood: x / Protein: 30 mg/dL / Nitrite: Negative   Leuk Esterase: Negative / RBC: 2 /hpf / WBC 3 /HPF   Sq Epi: x / Non Sq Epi: 1 /hpf / Bacteria: Negative    CXR: Patchy opacities bilaterally superimposed upon pulmonary emphysema with   right apical bullae.     EKG reviewed by me:  Rate: 64  Rhythm: NSR  Axis: normal axis  AL: 150  QRS: 80  QTC: 451  No Srikanth/STd/TWI/LVH    RVP+ adenovirus    Hba1c 8.2 CBC Full  -  ( 2019 04:28 )  WBC Count : 10.4 K/uL  RBC Count : 3.33 M/uL  Hemoglobin : 8.7 g/dL  Hematocrit : 26.8 %  Platelet Count - Automated : 428 K/uL  Mean Cell Volume : 80.5 fl  Mean Cell Hemoglobin : 26.2 pg  Mean Cell Hemoglobin Concentration : 32.5 gm/dL  Auto Neutrophil # : 9.0 K/uL  Auto Lymphocyte # : 0.5 K/uL  Auto Monocyte # : 0.9 K/uL  Auto Eosinophil # : 0.0 K/uL  Auto Basophil # : 0.0 K/uL  Auto Neutrophil % : 86.6 %  Auto Lymphocyte % : 4.6 %  Auto Monocyte % : 8.4 %  Auto Eosinophil % : 0.1 %  Auto Basophil % : 0.4 %        133<L>  |  97  |  11  ----------------------------<  152<H>  3.6   |  23  |  0.70    Ca    8.4      2019 06:46  Phos  3.8       Mg     1.6         LIVER FUNCTIONS - ( 2019 04:28 )  Alb: 2.6 g/dL / Pro: 6.6 g/dL / ALK PHOS: 56 U/L / ALT: 10 U/L / AST: 20 U/L / GGT: x           04:28 - VBG - pH: 7.50  | pCO2: 32    | pO2: 63    | Lactate: 2.0      Urinalysis Basic - ( 2019 06:58 )    Color: Light Yellow / Appearance: Clear / S.014 / pH: x  Gluc: x / Ketone: Negative  / Bili: Negative / Urobili: Negative   Blood: x / Protein: 30 mg/dL / Nitrite: Negative   Leuk Esterase: Negative / RBC: 2 /hpf / WBC 3 /HPF   Sq Epi: x / Non Sq Epi: 1 /hpf / Bacteria: Negative    CXR: Patchy opacities bilaterally superimposed upon pulmonary emphysema with   right apical bullae.     EKG reviewed by me:  Rate: 64  Rhythm: NSR  Axis: normal axis  UT: 150  QRS: 80  QTC: 451  No Srikanth/STd/TWI/LVH    RVP+ adenovirus    Hba1c 8.2    I personally reviewed & interpreted the lab findings above;  I personally reviewed & interpreted the radiographic findings;  I personally reviewed & interpreted the EKG findings;

## 2019-04-23 NOTE — H&P ADULT - NSHPREVIEWOFSYSTEMS_GEN_ALL_CORE
Constitutional: denies fevers, chills, night sweats, weight loss  HEENT: denies visual changes  Cardiovascular: denies palpitations, chest pain, edema  Respiratory: denies SOB, wheezing  Gastrointestinal: denies N/V/D, abdominal pain, hematochezia, melena  : denies dysuria  MSK: denies weakness, joint pain  Skin: denies new rashes or masses  Heme: denies bleeding  Neuro: denies headache Constitutional: denies chills, night sweats, weight loss  HEENT: denies visual changes  Cardiovascular: denies palpitations, chest pain, edema  Respiratory: denies SOB, wheezing  Gastrointestinal: denies N/V/D, abdominal pain, hematochezia, melena  : denies dysuria  MSK: denies weakness, joint pain  Skin: denies new rashes or masses  Heme: denies bleeding  Neuro: denies headache REVIEW OF SYSTEMS:    CONSTITUTIONAL: +fevers and chills today   ENMT:  No ear pain, No vertigo or throat pain  EYES: No visual changes  or photophobia  NECK: No pain or stiffness  RESPIRATORY: No cough, wheezing, hemoptysis; No shortness of breath  CARDIOVASCULAR: No chest pain or palpitations  GASTROINTESTINAL: No abdominal or epigastric pain. No nausea, vomiting, or hematemesis; No diarrhea or constipation. No melena or hematochezia.  MUSCULOSKELETAL: No joint swelling  or warmth.  GENITOURINARY: No dysuria, frequency or hematuria  NEUROLOGICAL: No numbness; +AMS earlier   PSYCHIATRIC: No depression or anhedonia.  ENDOCRINE: No sx hypoglycemic episodes.  SKIN: No itching, burning, rashes, or lesions

## 2019-04-23 NOTE — H&P ADULT - PROBLEM SELECTOR PLAN 6
-Patient has full medical decision making capacity regarding his medical illnesses. He had insight into his disease processes. He has a guarded prognosis. In the event of clinical deterioration the patient reports he would want chest compression and/or intubation to be performed.   -Will complete MOLST form and place in chart. -Patient has full medical decision making capacity regarding his medical illnesses. He had insight into his disease processes. He has a guarded prognosis. In the event of clinical deterioration the patient reports he would want a trial of chest compression and/or intubation to be performed. He would not want to be in a prolonged vegetative state. He has not officially appointed health care proxy but has spouse would likely be. He has two kids as well.   -Will complete MOLST form and place in chart. -Patient has full medical decision making capacity regarding his medical illnesses. He had insight into his disease processes. He has a guarded prognosis. In the event of clinical deterioration the patient reports he would want a trial of chest compression and/or intubation to be performed. He would not want to be in a prolonged vegetative state. He has not officially appointed health care proxy but has spouse would likely be. He has two kids as well. MOLST form completed to reflect patient's wishes. -Patient has full medical decision making capacity regarding his medical illnesses. He had insight into his disease processes. He has a guarded prognosis. In the event of clinical deterioration the patient reports he would want a trial of chest compression and/or intubation to be performed. He would not want to be in a prolonged vegetative state. He has not officially appointed health care proxy but has spouse would likely be. He has two kids as well. MOLST form completed to reflect patient's wishes. Total bedside time spent 30 minutes

## 2019-04-23 NOTE — PROGRESS NOTE ADULT - PROBLEM SELECTOR PLAN 1
-88% on RA with MAC and severe emphysema / bullae  -c/w 2L NC for now, in AM will start weaning.   -Pt already received first dose of antibiotics. Will need ID evaluation for continuation of antimicrobial therapy and duration. -88% on RA in setting of MAC and severe emphysema / bullae and + adenovirus  -c/w 2L NC for now, in AM will start weaning.   -Pt already received first dose of antibiotics. Will need ID evaluation for continuation of antimicrobial therapy and duration. -On presentation was 88% on RA in setting of MAC and severe emphysema / bullae and + adenovirus  -Currently satting well on RA  -As this is a viral infection will continue outpatient antibiotic regimen and discontinue Vancomycin and Zosyn.

## 2019-04-23 NOTE — PROGRESS NOTE ADULT - PROBLEM SELECTOR PLAN 4
-Initially presented with hypoglycemia in setting of decreased PO intake at lunch time after taking insulin, victoza and metformin in the AM.  ED spoke w pts endoc Dr. Jacobs, who recommending decreased tresiba dose from 20u to 16u.   Will contact endo AM to clarify Victoza and metformin doses. A1c 8.2% as pt got lantus in evening as fsg was acceptable. -Diagnosed 1-2 years ago; A1c 8.2 % this admission  -Initially presented with hypoglycemia in setting of decreased PO intake at lunch time after taking insulin, victoza and metformin in the AM.  -ED spoke w pts endoc Dr. Jacobs, who recommending decreased tresiba dose from 20u to 16u.   Will contact endo AM to clarify Victoza and metformin doses. A1c 8.2% as pt got lantus in evening as fsg was acceptable. -Diagnosed 1-2 years ago; A1c 8.2 % this admission  -Initially presented with hypoglycemia in setting of decreased PO intake at lunch time after taking insulin, Victoza and metformin in the AM.  -Evaluation by endocrine recommended decreased Tresiba dose from 20u to 16u  with outpatient follow up with patient's primary endocrinologist

## 2019-04-23 NOTE — DISCHARGE NOTE PROVIDER - NSDCCPCAREPLAN_GEN_ALL_CORE_FT
PRINCIPAL DISCHARGE DIAGNOSIS  Diagnosis: Adenovirus infection  Assessment and Plan of Treatment:       SECONDARY DISCHARGE DIAGNOSES  Diagnosis: Hypoglycemia  Assessment and Plan of Treatment:     Diagnosis: Mycobacterium avium-intracellulare complex  Assessment and Plan of Treatment: PRINCIPAL DISCHARGE DIAGNOSIS  Diagnosis: Adenovirus infection  Assessment and Plan of Treatment: At presentation, you had a fever of 102 so you were initially treated with additional IV antibiotics and Infectious Disease was consulted. After further work up, you were found to have an adenovirus infection in your upper airways in addition to your known VILMA infection in your lungs. As adenovirus is a viral infection, you were taken off the additional antibiotics and started on supportive care. You clinically improved this hospitalization. Please follow up with your outpatient primary care physician and outpatient Pulmonologist, Dr. Conklin, within 1-2 weeks fo your hospital discharge.      SECONDARY DISCHARGE DIAGNOSES  Diagnosis: Hypoglycemia  Assessment and Plan of Treatment: You presented to the hospital with two episodes of hypoglycemia. Once you were admitted, your insulin doses were reduced and your blood glucose levels were monitored. During your hospital course, you had another episode of hypoglycemia. Endocrinology was consulted who determined that these episodes were related to poor food intake. Please follow up with your outpatient Endocrinologist within 1 week of your hospital discharge.    Diagnosis: Acute hypoxemic respiratory failure  Assessment and Plan of Treatment: At presentation, you were found to have low oxygen levels in your blood which improved with supplemental oxygen. Your respiratory inhaler medications were continued this hospitalization. Please follow up with your outpatient Pulmonologist, Dr. Conklin, within 1-2 weeks of your hospital discharge.    Diagnosis: Mycobacterium avium-intracellulare complex  Assessment and Plan of Treatment: You have a history of mycobacterium-avium intracellulare complex (VILMA/MAC) infection in your lungs. You were previously hospitalized at [facility] for which you were started on an antimicrobial regimen consisting of ethambutol,  rifampin, ertapenem, and amikacin. These medications were continued this hospitalization. Please continue administering these anti microbials as prescribed. Please follow up with your outpatient Pulmonologist, Dr. Conklin, within 1-2 weeks of your hospital discharge PRINCIPAL DISCHARGE DIAGNOSIS  Diagnosis: Adenovirus infection  Assessment and Plan of Treatment: At presentation, you had a fever of 102 so you were initially treated with additional IV antibiotics and Infectious Disease was consulted. After further work up, you were found to have an adenovirus infection in your upper airways in addition to your known VILMA infection in your lungs. As adenovirus is a viral infection, you were taken off the additional antibiotics and started on supportive care. You clinically improved this hospitalization. Your oxygen levels in your blood were found to be slightly low which was treated with supplemental oxygen via nasal cannula. Your low oxygen levels are likely related to your VILMA infection superimposed with your adenovirus infection. Because of this, you will be discharged with supplemental oxygen. Please follow up with your outpatient primary care physician and outpatient Pulmonologist, Dr. Conklin, within 1-2 weeks fo your hospital discharge.      SECONDARY DISCHARGE DIAGNOSES  Diagnosis: Hypoglycemia  Assessment and Plan of Treatment: You presented to the hospital with two episodes of hypoglycemia. Once you were admitted, your insulin doses were reduced and your blood glucose levels were monitored. During your hospital course, you had another episode of hypoglycemia. Endocrinology was consulted who determined that these episodes were related to poor food intake. Labs were sent to rule out other causes of hypoglycemia, including adrenal insufficiency, hypothyroidism, and excessive insulin production, which all returned wtihin normal limits.    Your diabetes medication regimen has been adjusted to the following: Please take metformin 500 mg by mouth twice a day for one week. After one week, please increase your metformin dose to 1000 mg by mouth twice a day. Please start taking Januvia 100 mg by mouth every day. Please call [phone] to make an appointment at the [facility] located on [address] [place] [address] within 1-2 weeks of your hospital discharge.    Diagnosis: Acute hypoxemic respiratory failure  Assessment and Plan of Treatment: At presentation, you were found to have low oxygen levels in your blood which improved with supplemental oxygen. Your respiratory inhaler medications were continued this hospitalization. You will be discharged with supplemental oxygen. Please follow up with your outpatient Pulmonologist, Dr. Conklin, within 1-2 weeks of your hospital discharge.    Diagnosis: Mycobacterium avium-intracellulare complex  Assessment and Plan of Treatment: You have a history of mycobacterium-avium intracellulare complex (VILMA/MAC) infection in your lungs. You were previously hospitalized at [facility] for which you were started on an antimicrobial regimen consisting of ethambutol,  rifampin, ertapenem, and amikacin. These medications were continued this hospitalization. Please continue administering these anti microbials as prescribed. Please follow up with your outpatient Pulmonologist, Dr. Conklin, within 1-2 weeks of your hospital discharge

## 2019-04-23 NOTE — PROGRESS NOTE ADULT - PROBLEM SELECTOR PLAN 3
-Hypoglycemia x 2 yesterday including to 27 yesterday despite having large meal  -was on D10 and seen by MICU. FSGs now improved. Off D10.   -will monitor FSG q4 and before meals

## 2019-04-23 NOTE — H&P ADULT - NSHPPHYSICALEXAM_GEN_ALL_CORE
PHYSICAL EXAM:   GEN: Age appropriate, resting comfortably in bed, no acute distress, non toxic appearing, speaking in complete sentences.   HEENT: Conjunctiva and sclera normal  PULM: crackles bilaterally R>L   CV: RRR, S1S2, no MRG  Abdominal: Soft, nontender to palpation, non-distended, +BS  Extremities: No edema or cyanosis  NEURO: AAOx3  Skin: No rashes, lesions    T(C): 36.7 (04-22-19 @ 22:30), Max: 38.9 (04-22-19 @ 20:05)  HR: 78 (04-22-19 @ 20:05) (63 - 84)  BP: 139/72 (04-22-19 @ 20:05) (97/53 - 139/72)  RR: 19 (04-22-19 @ 20:07) (16 - 20)  SpO2: 98% (04-22-19 @ 20:07) (88% - 100%) PHYSICAL EXAM:   GEN: Age appropriate, resting comfortably in bed, no acute distress, non toxic appearing, speaking in complete sentences. On 2L NC.   HEENT: Conjunctiva and sclera normal  PULM: crackles bilaterally R>L. Egophany R>L. Patient satting mid 90s of O2.   CV: RRR, S1S2, no MRG  Abdominal: Soft, nontender to palpation, non-distended, +BS  Extremities: No edema or cyanosis  NEURO: AAOx3  Skin: No rashes, lesions    T(C): 36.7 (04-22-19 @ 22:30), Max: 38.9 (04-22-19 @ 20:05)  HR: 78 (04-22-19 @ 20:05) (63 - 84)  BP: 139/72 (04-22-19 @ 20:05) (97/53 - 139/72)  RR: 19 (04-22-19 @ 20:07) (16 - 20)  SpO2: 98% (04-22-19 @ 20:07) (88% - 100%) PHYSICAL EXAM:  Vital Signs Last 24 Hrs  T(C): 36.4 (04-23-19 @ 00:23)  T(F): 97.5 (04-23-19 @ 00:23), Max: 102 (04-22-19 @ 20:05)  HR: 62 (04-23-19 @ 00:23) (62 - 84)  BP: 125/69 (04-23-19 @ 00:23)  BP(mean): --  RR: 18 (04-23-19 @ 00:23) (16 - 20)  SpO2: 100% (04-23-19 @ 00:23) (88% - 100%)  Wt(kg): --    Constitutional: NAD, awake and alert  EYES: EOMI  ENT:  Normal Hearing, no tonsillar exudates   Neck: Soft and supple , no thyromegaly   Respiratory: No respiratory distress. +crackles and egophony on right lung base.   Cardiovascular: S1 and S2, regular rate and rhythm, no Murmurs, gallops or rubs, no JVD,    Gastrointestinal: Bowel Sounds present, soft, nontender, nondistended, no guarding, no rebound  Extremities: No cyanosis or clubbing; warm to touch  Vascular: 2+ peripheral pulses lower ex  Neurological: No focal deficits, CN II-XII intact bilaterally, sensation to light touch intact in all extremities. Pupils are equally reactive to light and symmetrical in size.   Musculoskeletal: 5/5 strength b/l upper and lower extremities; no joint swelling.  Skin: No rashes around PICC line site  Psych: no depression or anhedonia, AAOx3  HEME: no bruises, no nose bleeds

## 2019-04-23 NOTE — CONSULT NOTE ADULT - SUBJECTIVE AND OBJECTIVE BOX
Patient is a 74y old  Male who presents with a chief complaint of hypoxia and fever, (2019 08:25)    HPI:  The patient is a 73 yo male with a PMH of T2DM (on insulin, victoza and metformin), VILMA (dx 2018, on IV abx via PICC placed  at [facility]), TB (age 15, txtd with PCN), who presents to hospital after developing hypoglycemia at home. The patient reports that yesterday he went to bed around mid day and his daughter and wife called 911 when they could not arouse him. He was found to have a FSG of 27 and was given d50 and juice. He reports he felt back to normal and declined to go to the hospital however a few hours later he became lethargic again and was brought to the ED by EMS because of repeated hypoglycemia. In the ED he denies any active complaints. He reports he took his insulin victoza and metformin yesterday AM and then went to [facility] where he had pancakes sausages and eggs. For lunch he had a light meal of cereal with bananas. He reports that he administered his abx and then went to lie down to clear out of the living where his nieces and nephews were playing. He reports he was recently in [facility]   -  for MAC and had a PICC placed which he has been using to give himself abx at home. He follows closely with his pulm Dr Maurice. He is in the process of switching endocrinologist.     In the ED vitals signs: Temp 102, 84, 97/53, 19, 88% on room air. The patient was started on a D10 drip infusion in the ED which has been discontinued. The patient received IV amikacin, IV ertapenem, PO ethambutol and PO rifampin. The patient received acetaminophen and potassium. He was initially seen by MICU for hypoglycemia however not accepted as resolved. (2019 00:07)      prior hospital charts reviewed [  ]  primary team notes reviewed [  ]  other consultant notes reviewed [  ]    PAST MEDICAL & SURGICAL HISTORY:  Type 2 diabetes mellitus with complication, without long-term current use of insulin  Mycobacterium avium-intracellulare complex  No significant past surgical history      Allergies  No Known Allergies    ANTIMICROBIALS (past 90 days)  MEDICATIONS  (STANDING):  amiKACIN  IVPB   253 mL/Hr IV Intermittent (19 @ 14:32)    ertapenem  IVPB   120 mL/Hr IV Intermittent (19 @ 15:50)    ethambutol   400 milliGRAM(s) Oral (19 @ 14:50)    piperacillin/tazobactam IVPB.   200 mL/Hr IV Intermittent (19 @ 03:34)    rifampin   300 milliGRAM(s) Oral (19 @ 16:27)    vancomycin  IVPB   250 mL/Hr IV Intermittent (19 @ 04:21)        ANTIMICROBIALS:    piperacillin/tazobactam IVPB. 3.375 every 8 hours  vancomycin  IVPB 1000 every 12 hours  vancomycin  IVPB        OTHER MEDS: MEDICATIONS  (STANDING):  dextrose 40% Gel 15 once PRN  dextrose 50% Injectable 12.5 once  dextrose 50% Injectable 25 once  dextrose 50% Injectable 25 once  glucagon  Injectable 1 once PRN  heparin  Injectable 5000 every 8 hours  insulin glargine Injectable (LANTUS) 13 at bedtime  insulin lispro (HumaLOG) corrective regimen sliding scale  three times a day before meals  insulin lispro (HumaLOG) corrective regimen sliding scale  at bedtime      SOCIAL HISTORY:   hx smoking  non-smoker    FAMILY HISTORY:      REVIEW OF SYSTEMS  [  ] ROS unobtainable because:    [  ] All other systems negative except as noted below:	    Constitutional:  [ ] fever [ ] chills  [ ] weight loss  [ ] weakness  Skin:  [ ] rash [ ] phlebitis	  Eyes: [ ] icterus [ ] pain  [ ] discharge	  ENMT: [ ] sore throat  [ ] thrush [ ] ulcers [ ] exudates  Respiratory: [ ] dyspnea [ ] hemoptysis [ ] cough [ ] sputum	  Cardiovascular:  [ ] chest pain [ ] palpitations [ ] edema	  Gastrointestinal:  [ ] nausea [ ] vomiting [ ] diarrhea [ ] constipation [ ] pain	  Genitourinary:  [ ] dysuria [ ] frequency [ ] hematuria [ ] discharge [ ] flank pain  [ ] incontinence  Musculoskeletal:  [ ] myalgias [ ] arthralgias [ ] arthritis  [ ] back pain  Neurological:  [ ] headache [ ] seizures  [ ] confusion/altered mental status  Psychiatric:  [ ] anxiety [ ] depression	  Hematology/Lymphatics:  [ ] lymphadenopathy  Endocrine:  [ ] adrenal [ ] thyroid  Allergic/Immunologic:	 [ ] transplant [ ] seasonal    Vital Signs Last 24 Hrs  T(F): 99 (04-23-19 @ 07:47), Max: 102 (19 @ 20:05)    Vital Signs Last 24 Hrs  HR: 85 (19 @ 07:47) (62 - 85)  BP: 107/50 (19 @ 07:47) (103/69 - 139/72)  RR: 18 (19 @ 07:47)  SpO2: 94% (19 @ 07:47) (88% - 100%)  Wt(kg): --    PHYSICAL EXAM:  General: non-toxic  HEAD/EYES: anicteric, PERRL  ENT:  supple  Cardiovascular:   S1, S2  Respiratory:  clear bilaterally  GI:  soft, non-tender, normal bowel sounds  :  no CVA tenderness   Musculoskeletal:  no synovitis  Neurologic:  grossly non-focal  Skin:  no rash  Lymph: no lymphadenopathy  Psychiatric:  appropriate affect  Vascular:  no phlebitis                                8.7    10.4  )-----------( 428      ( 2019 04:28 )             26.8         131<L>  |  95<L>  |  8   ----------------------------<  149<H>  3.9   |  24  |  0.71    Ca    8.6      2019 06:55  Phos  3.8       Mg     1.6         TPro  6.6  /  Alb  2.6<L>  /  TBili  0.2  /  DBili  x   /  AST  20  /  ALT  10  /  AlkPhos  56        Urinalysis Basic - ( 2019 06:58 )    Color: Light Yellow / Appearance: Clear / S.014 / pH: x  Gluc: x / Ketone: Negative  / Bili: Negative / Urobili: Negative   Blood: x / Protein: 30 mg/dL / Nitrite: Negative   Leuk Esterase: Negative / RBC: 2 /hpf / WBC 3 /HPF   Sq Epi: x / Non Sq Epi: 1 /hpf / Bacteria: Negative        MICROBIOLOGY:    Rapid RVP Result: Detected ( @ 22:00)          RADIOLOGY:  < from: Xray Chest 1 View AP/PA (19 @ 06:14) >    IMPRESSION:    Patchy opacities bilaterally superimposed upon pulmonary emphysema with   right apical bullae.    < end of copied text > Patient is a 74y old  Male who presents with a chief complaint of hypoxia and fever, (2019 08:25)    HPI:  The patient is a 75 yo male with a PMH of T2DM (on insulin, victoza and metformin), VILMA (dx 2018, on IV abx via PICC placed  at [facility]), TB (age 15, txtd with PCN), who presents to hospital after developing hypoglycemia at home. The patient reports that yesterday he went to bed around mid day and his daughter and wife called 911 when they could not arouse him. He was found to have a FSG of 27 and was given d50 and juice. He reports he felt back to normal and declined to go to the hospital however a few hours later he became lethargic again and was brought to the ED by EMS because of repeated hypoglycemia. In the ED he denies any active complaints. He reports he took his insulin victoza and metformin yesterday AM and then went to [facility] where he had pancakes sausages and eggs. For lunch he had a light meal of cereal with bananas. He reports that he administered his abx and then went to lie down to clear out of the living where his nieces and nephews were playing. He reports he was recently in [facility]   -  for MAC and had a PICC placed which he has been using to give himself abx at home. He follows closely with his pulm Dr Maurice. He is in the process of switching endocrinologist.     In the ED vitals signs: Temp 102, 84, 97/53, 19, 88% on room air. The patient was started on a D10 drip infusion in the ED which has been discontinued. The patient received IV amikacin, IV ertapenem, PO ethambutol and PO rifampin. The patient received acetaminophen and potassium. He was initially seen by MICU for hypoglycemia however not accepted as resolved. (2019 00:07)    Patient reported he presented for low blood glucose. He had been diagnosed with MAC infection in [facility] and discharged on  with PCC line. He had been administering Amikacin 3 times a week  and ertapenem daily and taking rifampin and ethambutol pills as directed.   He denied increased cough, SOB. Denied diarrhea, dysuria, abdominal pain and fever.   Febrile in hospital. RVP positive for adenovirus.       prior hospital charts reviewed [  ]  primary team notes reviewed [  ]  other consultant notes reviewed [  ]    PAST MEDICAL & SURGICAL HISTORY:  Type 2 diabetes mellitus with complication, without long-term current use of insulin  Mycobacterium avium-intracellulare complex  No significant past surgical history      Allergies  No Known Allergies    ANTIMICROBIALS (past 90 days)  MEDICATIONS  (STANDING):  amiKACIN  IVPB   253 mL/Hr IV Intermittent (19 @ 14:32)    ertapenem  IVPB   120 mL/Hr IV Intermittent (19 @ 15:50)    ethambutol   400 milliGRAM(s) Oral (19 @ 14:50)    piperacillin/tazobactam IVPB.   200 mL/Hr IV Intermittent (19 @ 03:34)    rifampin   300 milliGRAM(s) Oral (19 @ 16:27)    vancomycin  IVPB   250 mL/Hr IV Intermittent (19 @ 04:21)        ANTIMICROBIALS:    piperacillin/tazobactam IVPB. 3.375 every 8 hours  vancomycin  IVPB 1000 every 12 hours  vancomycin  IVPB        OTHER MEDS: MEDICATIONS  (STANDING):  dextrose 40% Gel 15 once PRN  dextrose 50% Injectable 12.5 once  dextrose 50% Injectable 25 once  dextrose 50% Injectable 25 once  glucagon  Injectable 1 once PRN  heparin  Injectable 5000 every 8 hours  insulin glargine Injectable (LANTUS) 13 at bedtime  insulin lispro (HumaLOG) corrective regimen sliding scale  three times a day before meals  insulin lispro (HumaLOG) corrective regimen sliding scale  at bedtime      SOCIAL HISTORY:   hx smoking  non-smoker    FAMILY HISTORY:      REVIEW OF SYSTEMS  [  ] ROS unobtainable because:    [  ] All other systems negative except as noted below:	    Constitutional:  [ ] fever [ ] chills  [ ] weight loss  [ ] weakness  Skin:  [ ] rash [ ] phlebitis	  Eyes: [ ] icterus [ ] pain  [ ] discharge	  ENMT: [ ] sore throat  [ ] thrush [ ] ulcers [ ] exudates  Respiratory: [ ] dyspnea [ ] hemoptysis [ ] cough [ ] sputum	  Cardiovascular:  [ ] chest pain [ ] palpitations [ ] edema	  Gastrointestinal:  [ ] nausea [ ] vomiting [ ] diarrhea [ ] constipation [ ] pain	  Genitourinary:  [ ] dysuria [ ] frequency [ ] hematuria [ ] discharge [ ] flank pain  [ ] incontinence  Musculoskeletal:  [ ] myalgias [ ] arthralgias [ ] arthritis  [ ] back pain  Neurological:  [ ] headache [ ] seizures  [ ] confusion/altered mental status  Psychiatric:  [ ] anxiety [ ] depression	  Hematology/Lymphatics:  [ ] lymphadenopathy  Endocrine:  [ ] adrenal [ ] thyroid  Allergic/Immunologic:	 [ ] transplant [ ] seasonal    Vital Signs Last 24 Hrs  T(F): 99 (19 @ 07:47), Max: 102 (19 @ 20:05)    Vital Signs Last 24 Hrs  HR: 85 (19 @ 07:47) (62 - 85)  BP: 107/50 (19 @ 07:47) (103/69 - 139/72)  RR: 18 (19 @ 07:47)  SpO2: 94% (19 @ 07:47) (88% - 100%)  Wt(kg): --    PHYSICAL EXAM:  General: non-toxic  HEAD/EYES: anicteric, PERRL  ENT:  supple  Cardiovascular:   S1, S2  Respiratory:  clear bilaterally  GI:  soft, non-tender, normal bowel sounds  :  no CVA tenderness   Musculoskeletal:  no synovitis  Neurologic:  grossly non-focal  Skin:  no rash  Lymph: no lymphadenopathy  Psychiatric:  appropriate affect  Vascular:  no phlebitis                                8.7    10.4  )-----------( 428      ( 2019 04:28 )             26.8         131<L>  |  95<L>  |  8   ----------------------------<  149<H>  3.9   |  24  |  0.71    Ca    8.6      2019 06:55  Phos  3.8       Mg     1.6         TPro  6.6  /  Alb  2.6<L>  /  TBili  0.2  /  DBili  x   /  AST  20  /  ALT  10  /  AlkPhos  56        Urinalysis Basic - ( 2019 06:58 )    Color: Light Yellow / Appearance: Clear / S.014 / pH: x  Gluc: x / Ketone: Negative  / Bili: Negative / Urobili: Negative   Blood: x / Protein: 30 mg/dL / Nitrite: Negative   Leuk Esterase: Negative / RBC: 2 /hpf / WBC 3 /HPF   Sq Epi: x / Non Sq Epi: 1 /hpf / Bacteria: Negative        MICROBIOLOGY:    Rapid RVP Result: Detected ( @ 22:00)          RADIOLOGY:  < from: Xray Chest 1 View AP/PA (19 @ 06:14) >    IMPRESSION:    Patchy opacities bilaterally superimposed upon pulmonary emphysema with   right apical bullae. Patient is a 74y old  Male who presents with a chief complaint of hypoxia and fever, (2019 08:25)    HPI:  The patient is a 73 yo male with a PMH of T2DM (on insulin, victoza and metformin), VILMA (dx 2018, on IV abx via PICC placed  at [facility]), TB (age 15, txtd with PCN), who presents to hospital after developing hypoglycemia at home. The patient reports that yesterday he went to bed around mid day and his daughter and wife called 911 when they could not arouse him. He was found to have a FSG of 27 and was given d50 and juice. He reports he felt back to normal and declined to go to the hospital however a few hours later he became lethargic again and was brought to the ED by EMS because of repeated hypoglycemia. In the ED he denies any active complaints. He reports he took his insulin victoza and metformin yesterday AM and then went to [facility] where he had pancakes sausages and eggs. For lunch he had a light meal of cereal with bananas. He reports that he administered his abx and then went to lie down to clear out of the living where his nieces and nephews were playing. He reports he was recently in [facility]   -  for MAC and had a PICC placed which he has been using to give himself abx at home. He follows closely with his pulm Dr Maurice. He is in the process of switching endocrinologist.     In the ED vitals signs: Temp 102, 84, 97/53, 19, 88% on room air. The patient was started on a D10 drip infusion in the ED which has been discontinued. The patient received IV amikacin, IV ertapenem, PO ethambutol and PO rifampin. The patient received acetaminophen and potassium. He was initially seen by MICU for hypoglycemia however not accepted as resolved. (2019 00:07)    Patient reported he presented for low blood glucose. He had been diagnosed with MAC infection in [facility] and discharged on  with PCC line. He had been administering Amikacin 3 times a week and Ertapenem daily and taking rifampin and ethambutol pills as directed.   He denied increased cough, SOB. Denied diarrhea, dysuria, abdominal pain and fever.   Febrile in hospital. RVP positive for adenovirus.     prior hospital charts reviewed [ x ]  primary team notes reviewed [ x ]  other consultant notes reviewed [ x ]    PAST MEDICAL & SURGICAL HISTORY:  Type 2 diabetes mellitus with complication, without long-term current use of insulin  Mycobacterium avium-intracellulare complex  No significant past surgical history      Allergies  No Known Allergies    ANTIMICROBIALS (past 90 days)  MEDICATIONS  (STANDING):  amiKACIN  IVPB   253 mL/Hr IV Intermittent (19 @ 14:32)    ertapenem  IVPB   120 mL/Hr IV Intermittent (19 @ 15:50)    ethambutol   400 milliGRAM(s) Oral (19 @ 14:50)    piperacillin/tazobactam IVPB.   200 mL/Hr IV Intermittent (19 @ 03:34)    rifampin   300 milliGRAM(s) Oral (19 @ 16:27)    vancomycin  IVPB   250 mL/Hr IV Intermittent (19 @ 04:21)        ANTIMICROBIALS:    piperacillin/tazobactam IVPB. 3.375 every 8 hours  vancomycin  IVPB 1000 every 12 hours  vancomycin  IVPB        OTHER MEDS: MEDICATIONS  (STANDING):  dextrose 40% Gel 15 once PRN  dextrose 50% Injectable 12.5 once  dextrose 50% Injectable 25 once  dextrose 50% Injectable 25 once  glucagon  Injectable 1 once PRN  heparin  Injectable 5000 every 8 hours  insulin glargine Injectable (LANTUS) 13 at bedtime  insulin lispro (HumaLOG) corrective regimen sliding scale  three times a day before meals  insulin lispro (HumaLOG) corrective regimen sliding scale  at bedtime      SOCIAL HISTORY:   prior hx of smoking, current non-smoker    FAMILY HISTORY: Mother alive, 99,   Father, , 96, Alzheimer's disease     REVIEW OF SYSTEMS  [  ] ROS unobtainable because:    [x] All other systems negative except as noted below:	    Constitutional:  [ ] fever [ ] chills  [ ] weight loss  [ ] weakness  Skin:  [ ] rash [ ] phlebitis	  Eyes: [ ] icterus [ ] pain  [ ] discharge	  ENMT: [ ] sore throat  [ ] thrush [ ] ulcers [ ] exudates  Respiratory: [ ] dyspnea [ ] hemoptysis [ ] cough [ ] sputum	  Cardiovascular:  [ ] chest pain [ ] palpitations [ ] edema	  Gastrointestinal:  [ ] nausea [ ] vomiting [ ] diarrhea [ ] constipation [ ] pain	  Genitourinary:  [ ] dysuria [ ] frequency [ ] hematuria [ ] discharge [ ] flank pain  [ ] incontinence  Musculoskeletal:  [ ] myalgias [ ] arthralgias [ ] arthritis  [ ] back pain  Neurological:  [ ] headache [ ] seizures  [ ] confusion/altered mental status  Psychiatric:  [ ] anxiety [ ] depression	  Hematology/Lymphatics:  [ ] lymphadenopathy  Endocrine:  [ ] adrenal [ ] thyroid  Allergic/Immunologic:	 [ ] transplant [ ] seasonal    Vital Signs Last 24 Hrs  T(F): 99 (19 @ 07:47), Max: 102 (19 @ 20:05)    Vital Signs Last 24 Hrs  HR: 85 (19 @ 07:47) (62 - 85)  BP: 107/50 (19 @ 07:47) (103/69 - 139/72)  RR: 18 (19 @ 07:47)  SpO2: 94% (19 @ 07:47) (88% - 100%)  Wt(kg): --    PHYSICAL EXAM:  General: non-toxic, frail  HEAD/EYES: anicteric, PERRL  ENT:  supple  Cardiovascular:   S1, S2  Respiratory:  clear bilaterally  GI:  soft, non-tender, normal bowel sounds  :  no CVA tenderness   Musculoskeletal:  no synovitis  Neurologic:  grossly non-focal  Skin:  no rash  Lymph: no lymphadenopathy  Psychiatric:  appropriate affect  Vascular:  no phlebitis                            8.7    10.4  )-----------( 428      ( 2019 04:28 )             26.8     04-    131<L>  |  95<L>  |  8   ----------------------------<  149<H>  3.9   |  24  |  0.71    Ca    8.6      2019 06:55  Phos  3.8     -  Mg     1.6         TPro  6.6  /  Alb  2.6<L>  /  TBili  0.2  /  DBili  x   /  AST  20  /  ALT  10  /  AlkPhos  56        Urinalysis Basic - ( 2019 06:58 )    Color: Light Yellow / Appearance: Clear / S.014 / pH: x  Gluc: x / Ketone: Negative  / Bili: Negative / Urobili: Negative   Blood: x / Protein: 30 mg/dL / Nitrite: Negative   Leuk Esterase: Negative / RBC: 2 /hpf / WBC 3 /HPF   Sq Epi: x / Non Sq Epi: 1 /hpf / Bacteria: Negative        MICROBIOLOGY:    Rapid RVP Result: Detected ( @ 22:00)      RADIOLOGY:  < from: Xray Chest 1 View AP/PA (19 @ 06:14) >    IMPRESSION:    Patchy opacities bilaterally superimposed upon pulmonary emphysema with   right apical bullae. Patient is a 74y old  Male who presents with a chief complaint of hypoxia and fever, (2019 08:25)    HPI:  The patient is a 73 yo male with a PMH of T2DM (on insulin, victoza and metformin), VILMA (dx 2018, on IV abx via PICC placed  at [facility]), TB (age 15, txtd with PCN), who presents to hospital after developing hypoglycemia at home. The patient reports that yesterday he went to bed around mid day and his daughter and wife called 911 when they could not arouse him. He was found to have a FSG of 27 and was given d50 and juice. He reports he felt back to normal and declined to go to the hospital however a few hours later he became lethargic again and was brought to the ED by EMS because of repeated hypoglycemia. In the ED he denies any active complaints. He reports he took his insulin victoza and metformin yesterday AM and then went to [facility] where he had pancakes sausages and eggs. For lunch he had a light meal of cereal with bananas. He reports that he administered his abx and then went to lie down to clear out of the living where his nieces and nephews were playing. He reports he was recently in [facility]   -  for MAC and had a PICC placed which he has been using to give himself abx at home. He follows closely with his pulm Dr Maurice. He is in the process of switching endocrinologist.     In the ED vitals signs: Temp 102, 84, 97/53, 19, 88% on room air. The patient was started on a D10 drip infusion in the ED which has been discontinued. The patient received IV amikacin, IV ertapenem, PO ethambutol and PO rifampin. The patient received acetaminophen and potassium. He was initially seen by MICU for hypoglycemia however not accepted as resolved. (2019 00:07)    Patient reported he presented for low blood glucose. He had been diagnosed with MAC infection in [facility] and discharged on  with PCC line. He had been administering Amikacin 3 times a week and Ertapenem daily and taking rifampin and ethambutol pills as directed.   He denied increased cough, SOB. Denied diarrhea, dysuria, abdominal pain and fever.   Febrile in hospital. RVP positive for adenovirus.     PAST MEDICAL & SURGICAL HISTORY:  Type 2 diabetes mellitus with complication, without long-term current use of insulin  Mycobacterium avium-intracellulare complex  No significant past surgical history    Allergies  No Known Allergies    ANTIMICROBIALS (past 90 days)  MEDICATIONS  (STANDING):  amiKACIN  IVPB   253 mL/Hr IV Intermittent (19 @ 14:32)    ertapenem  IVPB   120 mL/Hr IV Intermittent (19 @ 15:50)    ethambutol   400 milliGRAM(s) Oral (19 @ 14:50)    piperacillin/tazobactam IVPB.   200 mL/Hr IV Intermittent (19 @ 03:34)    rifampin   300 milliGRAM(s) Oral (19 @ 16:27)    vancomycin  IVPB   250 mL/Hr IV Intermittent (19 @ 04:21)    ANTIMICROBIALS:    piperacillin/tazobactam IVPB. 3.375 every 8 hours  vancomycin  IVPB 1000 every 12 hours  vancomycin  IVPB      OTHER MEDS: MEDICATIONS  (STANDING):  dextrose 40% Gel 15 once PRN  dextrose 50% Injectable 12.5 once  dextrose 50% Injectable 25 once  dextrose 50% Injectable 25 once  glucagon  Injectable 1 once PRN  heparin  Injectable 5000 every 8 hours  insulin glargine Injectable (LANTUS) 13 at bedtime  insulin lispro (HumaLOG) corrective regimen sliding scale  three times a day before meals  insulin lispro (HumaLOG) corrective regimen sliding scale  at bedtime    SOCIAL HISTORY:   prior hx of smoking, current non-smoker    FAMILY HISTORY: Mother alive, 99,   Father, , 96, Alzheimer's disease     REVIEW OF SYSTEMS  [  ] ROS unobtainable because:    [x] All other systems negative except as noted below:	    Constitutional:  [ X] fever [ ] chills  [ ] weight loss  [ ] weakness  Skin:  [ ] rash [ ] phlebitis	  Eyes: [ ] icterus [ ] pain  [ ] discharge	  ENMT: [ ] sore throat  [ ] thrush [ ] ulcers [ ] exudates  Respiratory: [ X] dyspnea [ ] hemoptysis [ X] cough [ ] sputum	  Cardiovascular:  [ ] chest pain [ ] palpitations [ ] edema	  Gastrointestinal:  [ ] nausea [ ] vomiting [ ] diarrhea [ ] constipation [ ] pain	  Genitourinary:  [ ] dysuria [ ] frequency [ ] hematuria [ ] discharge [ ] flank pain  [ ] incontinence  Musculoskeletal:  [ ] myalgias [ ] arthralgias [ ] arthritis  [ ] back pain  Neurological:  [ ] headache [ ] seizures  [ ] confusion/altered mental status  Psychiatric:  [ ] anxiety [ ] depression	  Hematology/Lymphatics:  [ ] lymphadenopathy  Endocrine:  [ ] adrenal [ ] thyroid  Allergic/Immunologic:	 [ ] transplant [ ] seasonal    Vital Signs Last 24 Hrs  T(F): 99 (19 @ 07:47), Max: 102 (19 @ 20:05)    Vital Signs Last 24 Hrs  HR: 85 (19 @ 07:47) (62 - 85)  BP: 107/50 (19 @ 07:47) (103/69 - 139/72)  RR: 18 (19 @ 07:47)  SpO2: 94% (19 @ 07:47) (88% - 100%)      PHYSICAL EXAM:  General: non-toxic, frail  HEAD/EYES: anicteric, PERRL  ENT:  supple  Cardiovascular:   S1, S2  Respiratory:  clear bilaterally  GI:  soft, non-tender, normal bowel sounds  :  no CVA tenderness   Musculoskeletal:  no synovitis  Neurologic:  grossly non-focal  Skin:  no rash  Lymph: no lymphadenopathy  Psychiatric:  appropriate affect  Vascular:  no phlebitis    Labs:                        8.7    10.4  )-----------( 428      ( 2019 04:28 )             26.8     04-    131<L>  |  95<L>  |  8   ----------------------------<  149<H>  3.9   |  24  |  0.71    Ca    8.6      2019 06:55  Phos  3.8       Mg     1.6         TPro  6.6  /  Alb  2.6<L>  /  TBili  0.2  /  DBili  x   /  AST  20  /  ALT  10  /  AlkPhos  56      Urinalysis Basic - ( 2019 06:58 )    Color: Light Yellow / Appearance: Clear / S.014 / pH: x  Gluc: x / Ketone: Negative  / Bili: Negative / Urobili: Negative   Blood: x / Protein: 30 mg/dL / Nitrite: Negative   Leuk Esterase: Negative / RBC: 2 /hpf / WBC 3 /HPF   Sq Epi: x / Non Sq Epi: 1 /hpf / Bacteria: Negative    MICROBIOLOGY:    Rapid RVP Result: Detected ( @ 22:00) Adenovirus    RADIOLOGY:  < from: Xray Chest 1 View AP/PA (19 @ 06:14) >    IMPRESSION:    Patchy opacities bilaterally superimposed upon pulmonary emphysema with   right apical bullae.

## 2019-04-23 NOTE — H&P ADULT - PROBLEM SELECTOR PLAN 4
-DVT prophylaxis w HSQ  -Diet regular CC  -GOC patient is FULL CODE -Initially presented with hypoglycemia in setting of decreased PO intake at lunch time after taking insulin, victoza and metformin in the AM.  ED spoke w pts endoc Dr. Jacobs, who recommending decreased tresiba dose from 20u to 16u. Will hold glargine. low dose ISS. FSG q4   Will contact endo AM to clarify Victoza and metformin doses. A1c 8.2% -Initially presented with hypoglycemia in setting of decreased PO intake at lunch time after taking insulin, victoza and metformin in the AM.  ED spoke w pts endoc Dr. Jacobs, who recommending decreased tresiba dose from 20u to 16u.   Will contact endo AM to clarify Victoza and metformin doses. A1c 8.2% as pt got lantus -Initially presented with hypoglycemia in setting of decreased PO intake at lunch time after taking insulin, victoza and metformin in the AM.  ED spoke w pts endoc Dr. Jacobs, who recommending decreased tresiba dose from 20u to 16u.   Will contact endo AM to clarify Victoza and metformin doses. A1c 8.2% as pt got lantus in evening as fsg was acceptable.

## 2019-04-23 NOTE — H&P ADULT - ASSESSMENT
The patient is a 73 yo male with a PMH of T2DM (on insulin 20 units AM (tresiba), victoza and metformin), VILMA (dx 2018, on IV abx via PICC placed 04/19 at [facility]), TB (age 15, txtd with PCN), who presents to hospital after developing hypoglycemia at home. Required admission after spiking fever and O2 sat dropping to 88% on room air

## 2019-04-23 NOTE — H&P ADULT - PROBLEM SELECTOR PLAN 8
-Please clarify home meds in AM. Med rec is incomplete. Pharmacy team was emailed overnight-please follow up

## 2019-04-23 NOTE — DISCHARGE NOTE PROVIDER - CARE PROVIDER_API CALL
Ginny Conklin  [address] [place] [address]  Phone: [phone]  Fax: (   )    -  Follow Up Time:     Darion Salazar)  Infectious Disease; Internal Medicine  [address] [place] [address]  Phone: [phone]  Fax: [phone]  Follow Up Time: Darion Salazar)  Infectious Disease; Internal Medicine  [address] [place] [address]  Phone: [phone]  Fax: [phone]  Follow Up Time:     Ginny Conklin  [address] [place] [address]  Phone: [phone]  Fax: (   )    -  Follow Up Time:     Endocrine clinic,   [address] [place] [address]  Phone: [phone]  Fax: (   )    -  Follow Up Time:

## 2019-04-23 NOTE — DISCHARGE NOTE PROVIDER - CARE PROVIDERS DIRECT ADDRESSES
,DirectAddress_Unknown,[email or web address],DirectAddress_Unknown,DirectAddress_Unknown

## 2019-04-23 NOTE — PATIENT PROFILE ADULT - NSPROMEDSADMININFO_GEN_A_NUR
subjective     Chief Complaint   Patient presents with   • Follow-up   • Palpitations   • Edema   • Dizziness     History of Present Illness       HYPERTENSION  Meera Hull has long-standing history of mild labile hypertension.  Patient is not requiring any medications.  According to her blood pressure lately has been doing fairly well.  She is not requiring any medicines at this time.     Hyperlipidemia  Meera Hull has long-standing history of hyperlipidemia.  Has been trying to lose weight and trying to follow diet and activity recommandations.  Patient is tolerating medications very well.  There has been no side effects.    Patient is taking Lipitor 40 mg daily.  She will try to lose weight and continue current medications     Valvular heart disease  Patient has moderate aortic and mitral regurgitation but is asymptomatic at this time.  LV functions are normal to nearly there is no evidence of heart failure.     Her other problems consist of hypothyroidism and obesity and fatigue       Past Surgical History:   Procedure Laterality Date   • COLONOSCOPY W/ BIOPSIES  09/2014    Dr. Valenzuela-benign colonic mucosa w/ no diagnostic histopathologic abnormalities   • HYSTERECTOMY      partial   • TONSILLECTOMY       Family History   Problem Relation Age of Onset   • Heart attack Father    • Stroke Father    • Heart attack Other    • Cancer Mother    • Cancer Sister    • Cancer Sister    • Heart attack Sister    • Hypertension Sister    • Heart block Sister    • Aneurysm Sister      Past Medical History:   Diagnosis Date   • History of EKG 01/23/2015    normal except for rate   • Hyperlipidemia    • Hypertension    • Hypothyroidism    • Mild aortic insufficiency    • Obesity      Patient Active Problem List   Diagnosis   • Hypertension , labile*   • Hyperlipidemia*   • Hypothyroidism*   • Obesity*   • Chest pain in adult   • Valvular heart disease ,moderate aortic and mitral regurgitation   • Palpitation   • B12  deficiency       Social History   Substance Use Topics   • Smoking status: Former Smoker     Types: Cigarettes     Quit date: 11/2012   • Smokeless tobacco: Never Used   • Alcohol use No       Allergies   Allergen Reactions   • Iodinated Diagnostic Agents    • Sulfa Antibiotics        Current Outpatient Prescriptions on File Prior to Visit   Medication Sig   • aspirin 81 MG tablet Take 1 tablet by mouth daily.   • atorvastatin (LIPITOR) 40 MG tablet Take 1 tablet by mouth Daily.   • B Complex Vitamins (VITAMIN B COMPLEX) capsule capsule Take 1 capsule by mouth daily.   • Calcium Carbonate-Vitamin D 600-125 MG-UNIT tablet Take 1 tablet by mouth 2 (two) times a day.   • Coenzyme Q10 (CO Q-10 PO) Take 1 tablet by mouth daily.   • ferrous sulfate 325 (65 FE) MG tablet Take 325 mg by mouth daily.   • levothyroxine (SYNTHROID, LEVOTHROID) 50 MCG tablet Take 50 mcg by mouth daily.   • Multiple Vitamin (MULTI-VITAMIN PO) Take  by mouth daily.   • Omeprazole-Sodium Bicarbonate (ZEGERID PO) Take  by mouth daily.   • topiramate (TOPAMAX) 100 MG tablet Take 100 mg by mouth 2 (two) times a day.   • citalopram (CELEXA) 40 MG tablet Take 40 mg by mouth daily. Take 1/2 to 1 tablet   • metFORMIN (GLUCOPHAGE) 500 MG tablet Take 2 tablets by mouth as needed. Before meals     No current facility-administered medications on file prior to visit.          The following portions of the patient's history were reviewed and updated as appropriate: allergies, current medications, past family history, past medical history, past social history, past surgical history and problem list.    Review of Systems   Constitution: Positive for weakness and malaise/fatigue.   HENT: Negative.  Negative for congestion and headaches.    Eyes: Negative.    Cardiovascular: Negative.  Negative for chest pain, cyanosis, dyspnea on exertion, irregular heartbeat, leg swelling, near-syncope, orthopnea, palpitations, paroxysmal nocturnal dyspnea and syncope.  "  Respiratory: Negative.  Negative for shortness of breath.    Hematologic/Lymphatic: Negative.    Skin: Negative.    Musculoskeletal: Negative.    Gastrointestinal: Negative.    Genitourinary: Negative.    Psychiatric/Behavioral: Negative.         Bad dreams          Objective:     /68 (BP Location: Left arm, Patient Position: Sitting, Cuff Size: Adult)  Pulse 70  Ht 62\" (157.5 cm)  Wt 184 lb 6.4 oz (83.6 kg)  SpO2 98%  BMI 33.73 kg/m2  Physical Exam   Constitutional: She appears well-developed and well-nourished.   HENT:   Head: Normocephalic and atraumatic.   Mouth/Throat: Oropharynx is clear and moist.   Eyes: Conjunctivae and EOM are normal. Pupils are equal, round, and reactive to light. No scleral icterus.   Neck: Normal range of motion. Neck supple. No JVD present. No tracheal deviation present. No thyromegaly present.   Cardiovascular: Normal rate, regular rhythm, normal heart sounds and intact distal pulses.  Exam reveals no friction rub.    No murmur heard.  Pulmonary/Chest: Effort normal and breath sounds normal. No respiratory distress. She has no wheezes. She has no rales. She exhibits no tenderness.   Abdominal: Soft. Bowel sounds are normal. She exhibits no distension and no mass. There is no tenderness. There is no rebound and no guarding.   Musculoskeletal: Normal range of motion. She exhibits no edema, tenderness or deformity.   Lymphadenopathy:     She has no cervical adenopathy.   Neurological: She is alert. She has normal reflexes. No cranial nerve deficit. She exhibits normal muscle tone. Coordination normal.   Skin: Skin is warm and dry.   Psychiatric: She has a normal mood and affect. Her behavior is normal. Judgment and thought content normal.           Lab from [facility] through lab core 3/17/2017     hemoglobin A1c 5.9,   total cholesterol 182,   HDL 60,   ,   triglyceride 88   CBC and comprehensive metabolic panel and TSH  Normal.    Procedures       I personally " viewed and interpreted the patient's LAB data         Assessment:     1. Palpitation    2. Mixed hyperlipidemia    3. Valvular heart disease ,moderate aortic and mitral regurgitation          Plan:      Patient has not had any lab work since last visit.  However last visit showed LDL of 104.  She has been taking Lipitor 40 regularly and will have lab work checked at [facility] in 3 months.  Copy of lab work will be obtained at that time.      Otherwise patient seems to be doing very well.  No change in therapy.        Return in about 6 months (around 1/28/2018).   no concerns

## 2019-04-23 NOTE — DISCHARGE NOTE PROVIDER - HOSPITAL COURSE
HPI per admitting resident:         The patient is a 73 yo male with a PMH of T2DM (on insulin, victoza and metformin), VILMA (dx 2018, on IV abx via PICC placed 04/19 at [facility]), TB (age 15, txtd with PCN), who presents to hospital after developing hypoglycemia at home. The patient reports that yesterday he went to bed around mid day and his daughter and wife called 911 when they could not arouse him. He was found to have a FSG of 27 and was given d50 and juice. He reports he felt back to normal and declined to go to the hospital however a few hours later he became lethargic again and was brought to the ED by EMS because of repeated hypoglycemia. In the ED he denies any active complaints. He reports he took his insulin victoza and metformin yesterday AM and then went to [facility] where he had pancakes sausages and eggs. For lunch he had a light meal of cereal with bananas. He reports that he administered his abx and then went to lie down to clear out of the living where his nieces and nephews were playing. He reports he was recently in [facility] 4/9  - 4/17 for MAC and had a PICC placed which he has been using to give himself abx at home. He follows closely with his pulm Dr Maurice. He is in the process of switching endocrinologist.         Hospital course:         In the ED vitals signs: Temp 102, 84, 97/53, 19, 88% on room air. Notable labs revealed + adenovirus. For his hypoglycemia, he was started on a D10 drip infusion in the ED which has been discontinued.  He was initially seen by MICU for hypoglycemia however not accepted as it resolved. Per Endocrine, patient hypoglycemia likely in setting of decreased PO intake. For his fever, he was started on broad spectrum antibiotics. Patient was admitted to Medicine for further work up of his fever.         ID was consulted who recommended to stop the broad spectrum antibiotics and continue the patient's VILMA regimen of amikacin, ethambutol, ertapenem, and rifampin. It as determined that his fever was likely 2/2 + adenovirus and less likely VILMA or a superimposed PNA. HPI per admitting resident:         The patient is a 73 yo male with a PMH of T2DM (on insulin, victoza and metformin), VILMA (dx 2018, on IV abx via PICC placed 04/19 at [facility]), TB (age 15, txtd with PCN), who presents to hospital after developing hypoglycemia at home. The patient reports that yesterday he went to bed around mid day and his daughter and wife called 911 when they could not arouse him. He was found to have a FSG of 27 and was given d50 and juice. He reports he felt back to normal and declined to go to the hospital however a few hours later he became lethargic again and was brought to the ED by EMS because of repeated hypoglycemia. In the ED he denies any active complaints. He reports he took his insulin victoza and metformin yesterday AM and then went to [facility] where he had pancakes sausages and eggs. For lunch he had a light meal of cereal with bananas. He reports that he administered his abx and then went to lie down to clear out of the living where his nieces and nephews were playing. He reports he was recently in [facility] 4/9  - 4/17 for MAC and had a PICC placed which he has been using to give himself abx at home. He follows closely with his pulm Dr Maurice. He is in the process of switching endocrinologist.         Hospital course:         In the ED vitals signs: Temp 102, 84, 97/53, 19, 88% on room air. Notable labs revealed + adenovirus. For his hypoglycemia, he was started on a D10 drip infusion in the ED which has been discontinued.  He was initially seen by MICU for hypoglycemia however not accepted as it resolved. Per Endocrine, patient hypoglycemia likely in setting of decreased PO intake. For his fever, he was started on broad spectrum antibiotics. Patient was admitted to Medicine for further work up of his fever.         ID was consulted who recommended to stop the broad spectrum antibiotics and continue the patient's VILMA regimen of amikacin, ethambutol, ertapenem, and rifampin. It as determined that his fever was likely 2/2 + adenovirus and less likely VILMA or a superimposed PNA. Patient's hospital course was c/b an episode of hypoglycemia w/ blood glucose of 28 on BMP for which he received an AMP of D50 despite eating half of his dinner plate the night prior. Endocrinology was consulted who recommended to monitor him off insulin. HPI per admitting resident:         The patient is a 73 yo male with a PMH of T2DM (on insulin, victoza and metformin), VILMA (dx 2018, on IV abx via PICC placed 04/19 at [facility]), TB (age 15, txtd with PCN), who presents to hospital after developing hypoglycemia at home. The patient reports that yesterday he went to bed around mid day and his daughter and wife called 911 when they could not arouse him. He was found to have a FSG of 27 and was given d50 and juice. He reports he felt back to normal and declined to go to the hospital however a few hours later he became lethargic again and was brought to the ED by EMS because of repeated hypoglycemia. In the ED he denies any active complaints. He reports he took his insulin victoza and metformin yesterday AM and then went to [facility] where he had pancakes sausages and eggs. For lunch he had a light meal of cereal with bananas. He reports that he administered his abx and then went to lie down to clear out of the living where his nieces and nephews were playing. He reports he was recently in [facility] 4/9  - 4/17 for MAC and had a PICC placed which he has been using to give himself abx at home. He follows closely with his pulm Dr Maurice. He is in the process of switching endocrinologist.         Hospital course:         In the ED vitals signs: Temp 102, 84, 97/53, 19, 88% on room air. Notable labs revealed + adenovirus. For his hypoglycemia, he was started on a D10 drip infusion in the ED which has been discontinued.  He was initially seen by MICU for hypoglycemia however not accepted as it resolved. Per Endocrine, patient hypoglycemia likely in setting of decreased PO intake. For his fever, he was started on broad spectrum antibiotics. Patient was admitted to Medicine for further work up of his fever.         ID was consulted who recommended to stop the broad spectrum antibiotics and continue the patient's VILMA regimen of amikacin, ethambutol, ertapenem, and rifampin. It as determined that his fever was likely 2/2 + adenovirus and less likely VILMA or a superimposed PNA. Patient's hospital course was c/b an episode of hypoglycemia w/ blood glucose of 28 on BMP for which he received an AMP of D50 despite eating half of his dinner plate the night prior. Endocrinology was consulted who recommended to monitor him off insulin. R/o HPI per admitting resident:         The patient is a 73 yo male with a PMH of T2DM (on insulin, victoza and metformin), VILMA (dx 2018, on IV abx via PICC placed 04/19 at [facility]), TB (age 15, txtd with PCN), who presents to hospital after developing hypoglycemia at home. The patient reports that yesterday he went to bed around mid day and his daughter and wife called 911 when they could not arouse him. He was found to have a FSG of 27 and was given d50 and juice. He reports he felt back to normal and declined to go to the hospital however a few hours later he became lethargic again and was brought to the ED by EMS because of repeated hypoglycemia. In the ED he denies any active complaints. He reports he took his insulin victoza and metformin yesterday AM and then went to [facility] where he had pancakes sausages and eggs. For lunch he had a light meal of cereal with bananas. He reports that he administered his abx and then went to lie down to clear out of the living where his nieces and nephews were playing. He reports he was recently in [facility] 4/9  - 4/17 for MAC and had a PICC placed which he has been using to give himself abx at home. He follows closely with his pulm Dr Maurice. He is in the process of switching endocrinologist.         Hospital course:         In the ED vitals signs: Temp 102, 84, 97/53, 19, 88% on room air. Notable labs revealed + adenovirus. For his hypoglycemia, he was started on a D10 drip infusion in the ED which has been discontinued.  He was initially seen by MICU for hypoglycemia however not accepted as it resolved. Per Endocrine, patient hypoglycemia likely in setting of decreased PO intake. For his fever, he was started on broad spectrum antibiotics. Patient was admitted to Medicine for further work up of his fever.         ID was consulted who recommended to stop the broad spectrum antibiotics and continue the patient's VILMA regimen of amikacin, ethambutol, ertapenem, and rifampin. It as determined that his fever was likely 2/2 + adenovirus and less likely VILMA or a superimposed PNA. Patient's hospital course was c/b hypoxemia of 84-88% on RA which corrected to 2 L supplemental O2 NC. Physical therapy evaluated patient and he was found to be 94% on RA while ambulating but he was found to be intermittently satting at 84-88% on RA while at rest. His hypoxemia is likely 2/2 his VILMA infection w/ superimposed adenovirus infection. He will be discharged on supplemental O2 and reevaluated by his Pulmonologist, Dr. Conklin.  Patient's hospital course was c/b an episode of hypoglycemia w/ blood glucose of 28 on BMP for which he received an AMP of D50 despite eating half of his dinner plate the night prior. Endocrinology was consulted who recommended to monitor him off insulin. Causes of hypoglycemia were explored, including adrenal insufficiency, insulinoma, iatrogenic use , and hypothyroidism. ACTH, DHEA, AM cortisol, C peptide, and TSH were WNL. Glutamic acid decarboxylase antibody and islet antibody are pending but will f/u as outpatient. He was then monitored off insulin and his FS were found to range from 140-180. He will be discharged with the diabetes medication regimen of metformin 500 mg PO BID for one week which will then be titrated  up to 1000 mg BID and januvia 100 mg PO daily. He will follow up at the endocrine clinic on [address] [facility].

## 2019-04-23 NOTE — H&P ADULT - PROBLEM SELECTOR PLAN 1
-Dx 2018. On multiple abx in past. Current regiment: Amikacin, Ertapenem, Ethambutol, Levaquin, Rifampin. PICC placed during admission earlier this month at [facility]. Spiked fever in ED to 102 without other SIRS criteria. May be 2/2 to MAC however patient also RVP+ for adenovirus. Will contact patients pulm Dr Katerin MERCHANT to discuss further care (ED spoke to Katerin 4/22)  -bcx sent to r/o bacteremia in setting of fevers and recent PICC line placement -88% on RA with MAC and severe emphysema / bullae  -c/w 2L NC -88% on RA with MAC and severe emphysema / bullae  -c/w 2L NC for now, in AM will start weaning.   -Pt already received first dose of antibiotics. Will need ID evaluation for continuation of antimicrobial therapy and duration.

## 2019-04-23 NOTE — H&P ADULT - ATTENDING COMMENTS
Patient assigned to me by night hospitalist in charge for management and care for patient for this evening only. Care to be resumed by day hospitalist in the morning and thereafter. Patient assigned to me by night hospitalist in charge for management and care for patient for this evening only. Care to be resumed by day hospitalist in the morning and thereafter.    Patient seen and examined at bedside. Agree with the resident note above. Changes made to note above where appropriate. Pt does presents wit Patient assigned to me by night hospitalist in charge for management and care for patient for this evening only. Care to be resumed by day hospitalist in the morning and thereafter.    Patient seen and examined at bedside. Agree with the resident note above. Changes made to note above where appropriate. ID was consulted by me and spoke with fellow who recommends to cover for HCAP right now and hold off on MAC therapy until we can get the culture and sensitivities record from [facility]. Will start vancomycin/zosyn. Patient assigned to me by night hospitalist in charge for management and care for patient for this evening only. Care to be resumed by day hospitalist in the morning and thereafter.    Patient seen and examined at bedside. Agree with the resident note above. Changes made to note above where appropriate. ID was consulted by me and spoke with fellow who recommends to cover for HCAP right now and hold off on MAC therapy until we can get the culture and sensitivities record from [facility]. Will start vancomycin/zosyn. ID will evaluate patient. Will c/w fsg q4hr, next due at 4am. If fsg acceptable will change it to TID and bedtime. Agree with the advance care planning note above. MOLS form completed. Pt is FULL code. Patient assigned to me by night hospitalist in charge for management and care for patient for this evening only. Care to be resumed by day hospitalist in the morning and thereafter.    Patient seen and examined at bedside. Agree with the resident note above.  Changes made to note above where appropriate. ID was consulted by me and spoke with fellow who recommends to cover for HCAP right now and hold off on MAC therapy until we can get the culture and sensitivities record from [facility]. Will start vancomycin/zosyn. ID will evaluate patient. Will c/w fsg q4hr, next due at 4am. If fsg acceptable will change it to TID and bedtime. Agree with the advance care planning note above. MOLS form completed. Pt is FULL code.

## 2019-04-23 NOTE — H&P ADULT - PROBLEM SELECTOR PLAN 3
-RVP+ for adenovirus. Febrile to 102. -Hypoglycemia x 2 yesterday including to 27 yesterday despite having large meal  -was on D10 and seen by MICU. FSGs now improved.   -will monitor FSG q4 and before meals -Hypoglycemia x 2 yesterday including to 27 yesterday despite having large meal  -was on D10 and seen by MICU. FSGs now improved. Off D10.   -will monitor FSG q4 and before meals

## 2019-04-23 NOTE — H&P ADULT - PROBLEM SELECTOR PLAN 5
-RVP+ for adenovirus. Febrile to 102.  -no need for isolation. May be source of fevers -RVP+ for adenovirus. Febrile to 102.  -contact precautions. May be source of fevers -RVP+ for adenovirus. Febrile to 102.  -supportive care

## 2019-04-23 NOTE — H&P ADULT - NSICDXPASTMEDICALHX_GEN_ALL_CORE_FT
PAST MEDICAL HISTORY:  Mycobacterium avium-intracellulare complex     Type 2 diabetes mellitus with complication, without long-term current use of insulin

## 2019-04-23 NOTE — PROGRESS NOTE ADULT - ASSESSMENT
The patient is a 75 yo male with a PMH of T2DM (on insulin 20 units AM (tresiba), victoza and metformin), VILMA (dx 2018, on IV abx via PICC placed 04/19 at [facility]), TB (age 15, txtd with PCN), who presents to hospital after developing hypoglycemia at home. Required admission after spiking fever and O2 sat dropping to 88% on room air The patient is a 75 yo male with a PMH of T2DM (on insulin 20 units AM (tresiba), victoza and metformin), VILMA (dx 2018, on IV abx via PICC placed 04/19 at [facility]), TB (age 15, txtd with PCN), who presents to hospital after developing hypoglycemia at home. At presentation, patient was febrile to 102 and desaturated to 88% on room air, found to be adenovirus +.

## 2019-04-23 NOTE — DISCHARGE NOTE PROVIDER - PROVIDER TOKENS
FREE:[LAST:[Condos],FIRST:[Ginny],PHONE:[phone]],FAX:[(   )    -],ADDRESS:[[address] [place] [address]]],PROVIDER:[TOKEN:[[id number]]] PROVIDER:[TOKEN:[[id number]]],FREE:[LAST:[Condos],FIRST:[Rany],PHONE:[phone]:[(   )    -],ADDRESS:[[address] [place] [address]]],FREE:[LAST:[Endocrine clinic],PHONE:[phone]],FAX:[(   )    -],ADDRESS:[[address] [place] [address]]]

## 2019-04-24 DIAGNOSIS — E11.649 TYPE 2 DIABETES MELLITUS WITH HYPOGLYCEMIA WITHOUT COMA: ICD-10-CM

## 2019-04-24 DIAGNOSIS — E78.5 HYPERLIPIDEMIA, UNSPECIFIED: ICD-10-CM

## 2019-04-24 LAB
ANION GAP SERPL CALC-SCNC: 12 MMOL/L — SIGNIFICANT CHANGE UP (ref 5–17)
BUN SERPL-MCNC: 10 MG/DL — SIGNIFICANT CHANGE UP (ref 7–23)
CALCIUM SERPL-MCNC: 8.9 MG/DL — SIGNIFICANT CHANGE UP (ref 8.4–10.5)
CHLORIDE SERPL-SCNC: 95 MMOL/L — LOW (ref 96–108)
CO2 SERPL-SCNC: 25 MMOL/L — SIGNIFICANT CHANGE UP (ref 22–31)
CREAT SERPL-MCNC: 0.77 MG/DL — SIGNIFICANT CHANGE UP (ref 0.5–1.3)
CULTURE RESULTS: NO GROWTH — SIGNIFICANT CHANGE UP
GLUCOSE BLDC GLUCOMTR-MCNC: 155 MG/DL — HIGH (ref 70–99)
GLUCOSE BLDC GLUCOMTR-MCNC: 187 MG/DL — HIGH (ref 70–99)
GLUCOSE BLDC GLUCOMTR-MCNC: 238 MG/DL — HIGH (ref 70–99)
GLUCOSE BLDC GLUCOMTR-MCNC: 311 MG/DL — HIGH (ref 70–99)
GLUCOSE BLDC GLUCOMTR-MCNC: 43 MG/DL — CRITICAL LOW (ref 70–99)
GLUCOSE BLDC GLUCOMTR-MCNC: 44 MG/DL — CRITICAL LOW (ref 70–99)
GLUCOSE SERPL-MCNC: 28 MG/DL — CRITICAL LOW (ref 70–99)
GRAM STN FLD: SIGNIFICANT CHANGE UP
HCT VFR BLD CALC: 28.3 % — LOW (ref 39–50)
HGB BLD-MCNC: 8.8 G/DL — LOW (ref 13–17)
MAGNESIUM SERPL-MCNC: 1.7 MG/DL — SIGNIFICANT CHANGE UP (ref 1.6–2.6)
MCHC RBC-ENTMCNC: 24.6 PG — LOW (ref 27–34)
MCHC RBC-ENTMCNC: 31.1 GM/DL — LOW (ref 32–36)
MCV RBC AUTO: 79.1 FL — LOW (ref 80–100)
PHOSPHATE SERPL-MCNC: 3.5 MG/DL — SIGNIFICANT CHANGE UP (ref 2.5–4.5)
PLATELET # BLD AUTO: 455 K/UL — HIGH (ref 150–400)
POTASSIUM SERPL-MCNC: 3.3 MMOL/L — LOW (ref 3.5–5.3)
POTASSIUM SERPL-SCNC: 3.3 MMOL/L — LOW (ref 3.5–5.3)
RBC # BLD: 3.58 M/UL — LOW (ref 4.2–5.8)
RBC # FLD: 17.6 % — HIGH (ref 10.3–14.5)
SODIUM SERPL-SCNC: 132 MMOL/L — LOW (ref 135–145)
SPECIMEN SOURCE: SIGNIFICANT CHANGE UP
SPECIMEN SOURCE: SIGNIFICANT CHANGE UP
WBC # BLD: 9.28 K/UL — SIGNIFICANT CHANGE UP (ref 3.8–10.5)
WBC # FLD AUTO: 9.28 K/UL — SIGNIFICANT CHANGE UP (ref 3.8–10.5)

## 2019-04-24 PROCEDURE — 99233 SBSQ HOSP IP/OBS HIGH 50: CPT | Mod: GC

## 2019-04-24 PROCEDURE — 99233 SBSQ HOSP IP/OBS HIGH 50: CPT

## 2019-04-24 PROCEDURE — 99255 IP/OBS CONSLTJ NEW/EST HI 80: CPT | Mod: GC

## 2019-04-24 RX ORDER — FLUTICASONE FUROATE, UMECLIDINIUM BROMIDE AND VILANTEROL TRIFENATATE 200; 62.5; 25 UG/1; UG/1; UG/1
1 POWDER RESPIRATORY (INHALATION)
Qty: 0 | Refills: 0 | COMMUNITY

## 2019-04-24 RX ORDER — INSULIN LISPRO 100/ML
2 VIAL (ML) SUBCUTANEOUS ONCE
Qty: 0 | Refills: 0 | Status: COMPLETED | OUTPATIENT
Start: 2019-04-24 | End: 2019-04-24

## 2019-04-24 RX ORDER — ALBUTEROL 90 UG/1
2 AEROSOL, METERED ORAL
Qty: 0 | Refills: 0 | COMMUNITY

## 2019-04-24 RX ORDER — METFORMIN HYDROCHLORIDE 850 MG/1
2 TABLET ORAL
Qty: 0 | Refills: 0 | COMMUNITY

## 2019-04-24 RX ORDER — ACETAMINOPHEN 500 MG
650 TABLET ORAL ONCE
Qty: 0 | Refills: 0 | Status: COMPLETED | OUTPATIENT
Start: 2019-04-24 | End: 2019-04-24

## 2019-04-24 RX ORDER — POTASSIUM CHLORIDE 20 MEQ
40 PACKET (EA) ORAL ONCE
Qty: 0 | Refills: 0 | Status: COMPLETED | OUTPATIENT
Start: 2019-04-24 | End: 2019-04-24

## 2019-04-24 RX ORDER — DOCUSATE SODIUM 100 MG
100 CAPSULE ORAL DAILY
Qty: 0 | Refills: 0 | Status: DISCONTINUED | OUTPATIENT
Start: 2019-04-24 | End: 2019-04-26

## 2019-04-24 RX ORDER — POLYETHYLENE GLYCOL 3350 17 G/17G
17 POWDER, FOR SOLUTION ORAL DAILY
Qty: 0 | Refills: 0 | Status: DISCONTINUED | OUTPATIENT
Start: 2019-04-24 | End: 2019-04-26

## 2019-04-24 RX ORDER — DEXTROSE 50 % IN WATER 50 %
25 SYRINGE (ML) INTRAVENOUS ONCE
Qty: 0 | Refills: 0 | Status: COMPLETED | OUTPATIENT
Start: 2019-04-24 | End: 2019-04-24

## 2019-04-24 RX ORDER — SENNA PLUS 8.6 MG/1
2 TABLET ORAL AT BEDTIME
Qty: 0 | Refills: 0 | Status: DISCONTINUED | OUTPATIENT
Start: 2019-04-24 | End: 2019-04-26

## 2019-04-24 RX ORDER — SIMVASTATIN 20 MG/1
1 TABLET, FILM COATED ORAL
Qty: 0 | Refills: 0 | COMMUNITY

## 2019-04-24 RX ORDER — MAGNESIUM SULFATE 500 MG/ML
1 VIAL (ML) INJECTION ONCE
Qty: 0 | Refills: 0 | Status: COMPLETED | OUTPATIENT
Start: 2019-04-24 | End: 2019-04-24

## 2019-04-24 RX ORDER — CHLORHEXIDINE GLUCONATE 213 G/1000ML
1 SOLUTION TOPICAL
Qty: 0 | Refills: 0 | Status: DISCONTINUED | OUTPATIENT
Start: 2019-04-24 | End: 2019-04-26

## 2019-04-24 RX ORDER — IPRATROPIUM/ALBUTEROL SULFATE 18-103MCG
3 AEROSOL WITH ADAPTER (GRAM) INHALATION EVERY 6 HOURS
Qty: 0 | Refills: 0 | Status: DISCONTINUED | OUTPATIENT
Start: 2019-04-24 | End: 2019-04-24

## 2019-04-24 RX ORDER — BUDESONIDE AND FORMOTEROL FUMARATE DIHYDRATE 160; 4.5 UG/1; UG/1
2 AEROSOL RESPIRATORY (INHALATION)
Qty: 0 | Refills: 0 | Status: DISCONTINUED | OUTPATIENT
Start: 2019-04-24 | End: 2019-04-26

## 2019-04-24 RX ORDER — ALBUTEROL 90 UG/1
1 AEROSOL, METERED ORAL EVERY 4 HOURS
Qty: 0 | Refills: 0 | Status: DISCONTINUED | OUTPATIENT
Start: 2019-04-24 | End: 2019-04-26

## 2019-04-24 RX ORDER — DEXTROSE 50 % IN WATER 50 %
15 SYRINGE (ML) INTRAVENOUS ONCE
Qty: 0 | Refills: 0 | Status: DISCONTINUED | OUTPATIENT
Start: 2019-04-24 | End: 2019-04-26

## 2019-04-24 RX ORDER — SIMVASTATIN 20 MG/1
20 TABLET, FILM COATED ORAL AT BEDTIME
Qty: 0 | Refills: 0 | Status: DISCONTINUED | OUTPATIENT
Start: 2019-04-24 | End: 2019-04-26

## 2019-04-24 RX ORDER — INSULIN DEGLUDEC 100 U/ML
22 INJECTION, SOLUTION SUBCUTANEOUS
Qty: 0 | Refills: 0 | COMMUNITY

## 2019-04-24 RX ORDER — IPRATROPIUM/ALBUTEROL SULFATE 18-103MCG
3 AEROSOL WITH ADAPTER (GRAM) INHALATION EVERY 6 HOURS
Qty: 0 | Refills: 0 | Status: DISCONTINUED | OUTPATIENT
Start: 2019-04-24 | End: 2019-04-26

## 2019-04-24 RX ORDER — ETHAMBUTOL HYDROCHLORIDE 400 MG/1
2 TABLET, FILM COATED ORAL
Qty: 0 | Refills: 0 | COMMUNITY

## 2019-04-24 RX ADMIN — AMIKACIN SULFATE 101.6 MILLIGRAM(S): 250 INJECTION, SOLUTION INTRAMUSCULAR; INTRAVENOUS at 09:18

## 2019-04-24 RX ADMIN — Medication 650 MILLIGRAM(S): at 13:12

## 2019-04-24 RX ADMIN — Medication 650 MILLIGRAM(S): at 13:42

## 2019-04-24 RX ADMIN — HEPARIN SODIUM 5000 UNIT(S): 5000 INJECTION INTRAVENOUS; SUBCUTANEOUS at 05:02

## 2019-04-24 RX ADMIN — HEPARIN SODIUM 5000 UNIT(S): 5000 INJECTION INTRAVENOUS; SUBCUTANEOUS at 13:12

## 2019-04-24 RX ADMIN — SODIUM CHLORIDE 3 MILLILITER(S): 9 INJECTION INTRAMUSCULAR; INTRAVENOUS; SUBCUTANEOUS at 22:32

## 2019-04-24 RX ADMIN — SENNA PLUS 2 TABLET(S): 8.6 TABLET ORAL at 22:43

## 2019-04-24 RX ADMIN — Medication 3 MILLILITER(S): at 23:01

## 2019-04-24 RX ADMIN — HEPARIN SODIUM 5000 UNIT(S): 5000 INJECTION INTRAVENOUS; SUBCUTANEOUS at 22:38

## 2019-04-24 RX ADMIN — ERTAPENEM SODIUM 120 MILLIGRAM(S): 1 INJECTION, POWDER, LYOPHILIZED, FOR SOLUTION INTRAMUSCULAR; INTRAVENOUS at 13:14

## 2019-04-24 RX ADMIN — Medication 2 UNIT(S): at 23:04

## 2019-04-24 RX ADMIN — Medication 25 GRAM(S): at 08:26

## 2019-04-24 RX ADMIN — BUDESONIDE AND FORMOTEROL FUMARATE DIHYDRATE 2 PUFF(S): 160; 4.5 AEROSOL RESPIRATORY (INHALATION) at 18:52

## 2019-04-24 RX ADMIN — Medication 40 MILLIEQUIVALENT(S): at 09:18

## 2019-04-24 RX ADMIN — Medication 3 MILLILITER(S): at 18:52

## 2019-04-24 RX ADMIN — Medication 100 GRAM(S): at 10:49

## 2019-04-24 RX ADMIN — ETHAMBUTOL HYDROCHLORIDE 800 MILLIGRAM(S): 400 TABLET, FILM COATED ORAL at 13:12

## 2019-04-24 RX ADMIN — SODIUM CHLORIDE 3 MILLILITER(S): 9 INJECTION INTRAMUSCULAR; INTRAVENOUS; SUBCUTANEOUS at 13:17

## 2019-04-24 RX ADMIN — SODIUM CHLORIDE 3 MILLILITER(S): 9 INJECTION INTRAMUSCULAR; INTRAVENOUS; SUBCUTANEOUS at 05:45

## 2019-04-24 RX ADMIN — SIMVASTATIN 20 MILLIGRAM(S): 20 TABLET, FILM COATED ORAL at 22:43

## 2019-04-24 RX ADMIN — Medication 3 MILLILITER(S): at 13:11

## 2019-04-24 RX ADMIN — Medication 100 MILLIGRAM(S): at 18:52

## 2019-04-24 NOTE — CONSULT NOTE ADULT - ATTENDING COMMENTS
Patient is seen and examined.   75 yo with DM (on Tresiba, Victoza, Metformin), VILMA infection (on therapy) admitted with hypoglycemia in the setting of insulin use and missing lunch. Currently FSs are 117 and 151 on D10 gtt.   -- c/w D10 gtt for now, wean as needed, goal -180  -- q 3 hr FS, hold insulin and oral hypoglycemics  Not MICU candidate at this time. Please reconsult as needed.
73 yo male with a PMH of T2DM (on insulin, Victoza and metformin), VILMA (dx 2018, on IV abx via PICC placed 04/19 at [facility]), who presents to hospital after developing hypoglycemia at home.  On treatment for MAC, reportedly with Erta, Amikacin, Ethambutol, Rif  Presents with episode hypoglycemia, but found to have fevers on presentation  RVP positive for Adenovirus  Suspect fevers 2/2 viral URI  Overall, MAC, Adenovirus, upper respiratory infection  - Resume home doses of MAC treatment as above  - DC Vanco/zosyn  - Supportive care for viral URI  - On discharge, care of IV antibiotics to be managed by Pulmonologist who initiated regimen  - I counseled patient on possible adverse effects of MAC regimen, including visual disturbances/blindness and ototoxicty/renal toxicity; he communicates understanding  - Case discussed with primary team attending and outpatient pulmonologist    Darion Salazar MD  Pager [phone]  After 5pm and on weekends call [phone]    I was physically present for the key portions of the evaluation and management service provided. I saw and examined the patient. I agree with the above history, physical, and plan except for any discrepancies which I have documented in “Attending Attestation.” Please refer to “Attending Attestation” for final plan.
74 year old male with poorly controlled T2DM (A1c 8.8% with moderate anemia) complicated with severe repeated episodes of hypoglycemia. Tresiba is a long-acting insulin that can last for upto 48 hours. (Last dose of Tresiba was 4/21). He additionally received insulin Lantus 13 units on 4/22 and 4/23. Hypoglycemia may be due to insulin stacking in an elderly patient with acute infection and poor PO intake. However, other causes of hypoglycemia must be evaluate including Adrenal insufficiency (primary or secondary), thyroid disease, and insulin sensitivity as seen in [facility]. Can also check for rare diagnosis such as islet cell tumors by stat labs when FS<55. See labs above. For HLD, continue Simvastatin. F/u [facility], will set up an apt for him. Our service will follow.

## 2019-04-24 NOTE — PROGRESS NOTE ADULT - PROBLEM SELECTOR PLAN 4
-Diagnosed 1-2 years ago; A1c 8.2 % this admission  -Initially presented with hypoglycemia in setting of decreased PO intake at lunch time after taking insulin, Victoza and metformin in the AM.  -Evaluation by endocrine recommended decreased Tresiba dose from 20u to 16u  with outpatient follow up with patient's primary endocrinologist  -Current regimen: Lantus 13 U at bedtime, ISS -Diagnosed 1-2 years ago; A1c 8.2 % this admission  -Initially presented with hypoglycemia in setting of decreased PO intake at lunch time after taking insulin, Victoza and metformin in the AM.  -Initial evaluation by endocrine recommended decreasing Tresiba dose from 20u to 16u with outpatient follow up  -Current regimen: Lantus 13 U at bedtime, ISS  -Await endocrine follow up for further recommendations

## 2019-04-24 NOTE — PROGRESS NOTE ADULT - PROBLEM SELECTOR PLAN 1
-On presentation was 88% on RA in setting of MAC and severe emphysema / bullae and + adenovirus  -Currently satting at 91-94% on 2 L NC. Per RN, patient desaturates to 88% on RA. Mild hypoxemia may be 2/2 adenovirus vs. VILMA vs. atelectasis. Will attempt to wean off NC  -c/w supportive care, incentive spirometry, OOB to chair   -Of note, patient is not on supplemental O2 at home  -As this is a viral infection will continue outpatient antibiotic regimen and discontinue Vancomycin and Zosyn. -On presentation was 88% on RA in setting of MAC and severe emphysema / bullae and + adenovirus  -Currently satting at 91-94% on 2 L NC. Per RN, patient desaturates to 88% on RA. Mild hypoxemia may be 2/2 adenovirus vs. VILMA vs. atelectasis. Will attempt to wean off NC  -c/w supportive care, incentive spirometry, OOB to chair   -Of note, patient is not on supplemental O2 at home  -started symbicort BID, duoneb q6h, and albuterol PRN   -As this is a viral infection will continue outpatient antibiotic regimen and discontinue Vancomycin and Zosyn. -On presentation was 88% on RA in setting of MAC and severe emphysema / bullae and + adenovirus  -Currently satting at 91-94% on 2 L NC. Per RN, patient desaturates to 88% on RA. Mild hypoxemia may be 2/2 adenovirus vs. VILMA vs. atelectasis. Will attempt to wean off NC  -c/w supportive care, incentive spirometry, OOB to chair   -Of note, patient is not on supplemental O2 at home  -Per med rec, pt on trelegy ellipta and proventil outpatient. Started symbicort BID, duoneb q6h, and albuterol PRN -On presentation was 88% on RA in setting of MAC and severe emphysema / bullae and + adenovirus  -Currently satting at 91-94% on 2 L NC. Per RN, patient desaturates to 88% on RA. Mild hypoxemia may be 2/2 adenovirus vs. VILMA vs. atelectasis. Will attempt to wean off NC  -c/w supportive care, incentive spirometry, OOB to chair   -Of note, patient was not on supplemental O2 at home but may meet criteria on this admission at discharge  -Per med rec, pt on trelegy ellipta and proventil outpatient. Started symbicort BID, duoneb q6h, and albuterol PRN

## 2019-04-24 NOTE — PROGRESS NOTE ADULT - PROBLEM SELECTOR PLAN 2
-Dx 2018. On multiple abx in past. Current regiment: Amikacin, Ertapenem, Ethambutol, Levaquin, Rifampin. PICC placed during admission earlier this month at [facility].   -ID consulted  -Patient fever 2/2 + adenovirus; will continue VILMA antibiotic regimen and supportive care -Dx 2018. On multiple abx in past. Current regiment: Amikacin, Ertapenem, Ethambutol, Levaquin, Rifampin. PICC placed during admission earlier this month at [facility].   -ID consulted  -Patient fever likely 2/2 adenovirus; will continue VILMA antibiotic regimen and supportive care

## 2019-04-24 NOTE — CONSULT NOTE ADULT - PROBLEM SELECTOR RECOMMENDATION 9
-Patient with Type 2 DM (A1C 8.2)- fair control. Presents with hypoglycemia.  -Patient reports was recently hospitalized last few weeks, eating less at home, and currently with infection on IV abx, spiking fevers.   -Regarding hypoglycemia, patient is actually not meeting  criteria for Whipple's triad as he is not symptomatic at the time of low glucose. May also be due to recurrent hypoglycemia at home and patient now with hypoglycemia unawareness.  -For now, would discontinue humalog correctional scale and monitor FS off all DM meds. Goal -200. If persistent FS >200s, then can add humalog correctional.  -Given clinical picture of patient thin, decreased PO, hypoglycemia can also be exacerbated by poor nutritional status. Recommend continued optimization of nutritional status.  -Can monitor for now off dextrose IVF. Monitor FS with meals and bedtime, as well as check a 3AM FS.  -Although less, likely, can r/o for insulin overproduction. If next FS <55, can check STAT CMP, BHB, c-peptide, insulin, pro-insulin level for complete evaluation. -Patient with Type 2 DM (A1C 8.2)- fair control. Presents with hypoglycemia.  -Patient reports was recently hospitalized last few weeks, eating less at home, and currently with infection on IV abx, spiking fevers.   -Regarding hypoglycemia, patient is actually not meeting  criteria for Whipple's triad as he is not symptomatic at the time of low glucose. May also be due to recurrent hypoglycemia at home and patient now with hypoglycemia unawareness.  -Received lantus 13 units last night, now dc. For now, would also discontinue humalog correctional scale and monitor FS off all DM meds. Goal -200. If persistent FS >200s, then can add humalog correctional.  -Given clinical picture of patient thin, decreased PO, hypoglycemia can also be exacerbated by poor nutritional status. Recommend continued optimization of nutritional status.  -Can monitor for now off dextrose IVF. Monitor FS with meals and bedtime, as well as check an 3AM FS.  -Would also r/o other causes of hypoglycemia, like AI. check AM cortisol, DHEAS, ACTH  -check TSH  -Although less, likely, can r/o for insulin overproduction. If next FS <55, can check STAT CMP, BHB, c-peptide, insulin, pro-insulin level for complete evaluation.  -Will also assess patient's need for insulin in future: check c-peptide, CHANELL, islet cell ab  -BP at goal of <130/80 off meds. -Patient with Type 2 DM (A1C 8.2)- fair control. Presents with hypoglycemia.  -Patient reports was recently hospitalized last few weeks, eating less at home, and currently with infection on IV abx, spiking fevers.   -Regarding hypoglycemia, patient is actually not meeting  criteria for Whipple's triad as he is not symptomatic at the time of low glucose. May also be due to recurrent hypoglycemia at home and patient now with hypoglycemia unawareness.  -Received lantus 13 units last night, now dc. For now, would also discontinue humalog correctional scale and monitor FS off all DM meds. Goal -200. If persistent FS >200s, then can add humalog correctional. If FS<55, then recommend stat BMP, stat C-peptide, stat insulin and proinsulin levels prior to correcting FS.  -Given clinical picture of patient thin, decreased PO, hypoglycemia can also be exacerbated by poor nutritional status. Recommend continued optimization of nutritional status.  -Can monitor for now off dextrose IVF. Monitor FS with meals and bedtime, as well as check an 3AM FS daily  -Would also r/o other causes of hypoglycemia, like AI. check AM cortisol, DHEAS, ACTH  -check TSH and FT4  -Although less, likely, can r/o for insulin overproduction. If next FS <55, can check STAT CMP, BHB, c-peptide, insulin, pro-insulin level for complete evaluation.  -Will also assess patient's need for insulin in future: check c-peptide, CHANELL, islet cell ab  -BP at goal of <130/80 off meds.

## 2019-04-24 NOTE — CONSULT NOTE ADULT - ASSESSMENT
75 yo male with a PMH of T2DM (A1C 8.2), VILMA (dx 2018, on IV abx via PICC placed 04/19 at [facility]), TB (age 15, txtd with PCN), who presents to hospital after developing hypoglycemia at home. 75 yo male with a PMH of T2DM (A1C 8.2), VILMA (dx 2018, on IV abx via PICC placed 04/19 at [facility]), TB (age 15, txtd with PCN), who presents to hospital after developing hypoglycemia at home. Patient with persistent acute severe hypoglycemia this morning. (High risk and high medical decision making).

## 2019-04-24 NOTE — CONSULT NOTE ADULT - SUBJECTIVE AND OBJECTIVE BOX
Reason for consult: anemia    HPI: Pt known to our service; pt of Dr Gaming. The patient is a 73 yo male with a PMH of T2DM (on insulin, victoza and metformin), VILMA (dx 2018, on IV abx via PICC placed 04/19 at [facility]), TB (age 15, txtd with PCN), who presents to hospital after developing hypoglycemia at home.  He reports he was recently in [facility] 4/9  - 4/17 for MAC and had a PICC placed which he has been using to give himself abx at home. During this hospitalization, pt was found to have hypoxia. He feels weak overall, no muscle strength, still with SOB. Limited historian otherwise, upset about not being to ambulate much      PAST MEDICAL & SURGICAL HISTORY:  Type 2 diabetes mellitus with complication, without long-term current use of insulin  Mycobacterium avium-intracellulare complex  No significant past surgical history      FAMILY HISTORY:      Alochol: Denied  Smoking: Nonsmoker  Drug Use: Denied  Marital Status:         Allergies    No Known Allergies    Intolerances        MEDICATIONS  (STANDING):  ALBUTerol/ipratropium for Nebulization 3 milliLiter(s) Nebulizer every 6 hours  amiKACIN  IVPB 400 milliGRAM(s) IV Intermittent <User Schedule>  buDESOnide 160 MICROgram(s)/formoterol 4.5 MICROgram(s) Inhaler 2 Puff(s) Inhalation two times a day  dextrose 5%. 1000 milliLiter(s) (50 mL/Hr) IV Continuous <Continuous>  dextrose 50% Injectable 12.5 Gram(s) IV Push once  dextrose 50% Injectable 25 Gram(s) IV Push once  dextrose 50% Injectable 25 Gram(s) IV Push once  docusate sodium 100 milliGRAM(s) Oral daily  ertapenem  IVPB 1000 milliGRAM(s) IV Intermittent every 24 hours  ertapenem  IVPB      ethambutol 800 milliGRAM(s) Oral daily  heparin  Injectable 5000 Unit(s) SubCutaneous every 8 hours  rifampin 600 milliGRAM(s) Oral daily  senna 2 Tablet(s) Oral at bedtime  simvastatin 20 milliGRAM(s) Oral at bedtime  sodium chloride 0.9% lock flush 3 milliLiter(s) IV Push every 8 hours    MEDICATIONS  (PRN):  ALBUTerol    90 MICROgram(s) HFA Inhaler 1 Puff(s) Inhalation every 4 hours PRN Shortness of Breath and/or Wheezing  dextrose 40% Gel 15 Gram(s) Oral once PRN Blood Glucose LESS THAN 70 milliGRAM(s)/deciliter  dextrose 40% Gel 15 Gram(s) Oral once PRN Blood Glucose LESS THAN 70 milliGRAM(s)/deciliter  glucagon  Injectable 1 milliGRAM(s) IntraMuscular once PRN Glucose LESS THAN 70 milligrams/deciliter  polyethylene glycol 3350 17 Gram(s) Oral daily PRN Constipation      ROS  as above, limited 2/2 participation    T(C): 38.2 (04-24-19 @ 11:30), Max: 38.2 (04-24-19 @ 11:30)  HR: 83 (04-24-19 @ 11:30) (72 - 83)  BP: 107/58 (04-24-19 @ 11:30) (107/58 - 119/63)  RR: 18 (04-24-19 @ 11:30) (18 - 18)  SpO2: 93% (04-24-19 @ 11:30) (92% - 96%)  Wt(kg): --    PE  NAD  awake, alert  tachypneic with speaking  RRR  diminished BS throughout with crackles at R base  abd benign  no edema   remainder of exam limited 2/2 participation                          8.8    9.28  )-----------( 455      ( 24 Apr 2019 10:53 )             28.3       04-24    132<L>  |  95<L>  |  10  ----------------------------<  28<LL>  3.3<L>   |  25  |  0.77    Ca    8.9      24 Apr 2019 07:16  Phos  3.5     04-24  Mg     1.7     04-24

## 2019-04-24 NOTE — PROGRESS NOTE ADULT - SUBJECTIVE AND OBJECTIVE BOX
Patient is a 74y old  Male who presents with a chief complaint of hypoxia and fever (23 Apr 2019 18:27)    SUBJECTIVE / OVERNIGHT EVENTS: Patient seen and examined. No acute overnight events, no subjective complaints. Denies CP, SOB, worsening cough, hemoptysis, or fever/chills. Per RN, patient desaturated to 88% on RA, requiring 2L NC. This AM, pt satting 91-94% on 2 L NC. Glucose on BMP this AM 28, patient asymptomatic. Will recheck FS    OBJECTIVE:  Vital Signs Last 24 Hrs  T(C): 37.3 (24 Apr 2019 05:01), Max: 37.4 (23 Apr 2019 20:37)  T(F): 99.1 (24 Apr 2019 05:01), Max: 99.3 (23 Apr 2019 20:37)  HR: 72 (24 Apr 2019 05:01) (72 - 83)  BP: 115/57 (24 Apr 2019 05:01) (103/55 - 119/63)  BP(mean): --  RR: 18 (24 Apr 2019 05:01) (18 - 18)  SpO2: 96% (24 Apr 2019 05:01) (92% - 96%)    I&O's Summary    23 Apr 2019 07:01  -  24 Apr 2019 07:00  --------------------------------------------------------  IN: 870 mL / OUT: 350 mL / NET: 520 mL      Physical Examination:  GEN: thin man, NAD  PSYCH: A&Ox3, mood and affect appear appropriate   SKIN: intact, no e/o rash  NEURO: no focal neurologic deficits appreciated; CN II-XII intact, sensation intact B/L, motor 5/5 in all mm groups  EYES: PERRL, anicteric, EOMI, no vertical/horizontal nystagmus  HEAD: NC, AT  NECK: supple  RESPI: no accessory muscle use, inspiratory crackles noted b/l-more prominent in right lower lobes, no wheezing noted  CARDIO: S1 and S2, regular rate/rhythm, no LE edema B/L  ABD: soft, NT, ND, +BS  EXT: patient able to move all extremities spontaneously  VASC: peripheral pulses palpated, no LE edema     Labs:  CAPILLARY BLOOD GLUCOSE      POCT Blood Glucose.: 165 mg/dL (23 Apr 2019 22:11)  POCT Blood Glucose.: 143 mg/dL (23 Apr 2019 17:28)  POCT Blood Glucose.: 203 mg/dL (23 Apr 2019 12:21)  POCT Blood Glucose.: 126 mg/dL (23 Apr 2019 08:50)                          8.9    7.48  )-----------( 410      ( 23 Apr 2019 09:30 )             28.5     04-23    131<L>  |  95<L>  |  8   ----------------------------<  149<H>  3.9   |  24  |  0.71    Ca    8.6      23 Apr 2019 06:55              Culture - Sputum (collected 24 Apr 2019 01:16)  Source: .Sputum  Gram Stain (24 Apr 2019 06:04):    Few Squamous epithelial cells per low power field    Few polymorphonuclear leukocytes per low power field    Few Yeast like cells per oil power field    Rare Gram variable coccobacilli     per oil power field    Culture - Blood (collected 22 Apr 2019 12:23)  Source: .Blood  Preliminary Report (23 Apr 2019 13:01):    No growth to date.    Culture - Blood (collected 22 Apr 2019 12:23)  Source: .Blood  Preliminary Report (23 Apr 2019 13:01):    No growth to date.        Imaging Personally Reviewed:    Consultant(s) Notes Reviewed:   Care Discussed with Consultants/Other Providers:    MEDICATIONS  (STANDING):  amiKACIN  IVPB 400 milliGRAM(s) IV Intermittent <User Schedule>  dextrose 5%. 1000 milliLiter(s) (50 mL/Hr) IV Continuous <Continuous>  dextrose 50% Injectable 12.5 Gram(s) IV Push once  dextrose 50% Injectable 25 Gram(s) IV Push once  dextrose 50% Injectable 25 Gram(s) IV Push once  ertapenem  IVPB      ertapenem  IVPB 1000 milliGRAM(s) IV Intermittent every 24 hours  ethambutol 800 milliGRAM(s) Oral daily  heparin  Injectable 5000 Unit(s) SubCutaneous every 8 hours  insulin glargine Injectable (LANTUS) 13 Unit(s) SubCutaneous at bedtime  insulin lispro (HumaLOG) corrective regimen sliding scale   SubCutaneous three times a day before meals  insulin lispro (HumaLOG) corrective regimen sliding scale   SubCutaneous at bedtime  rifampin 600 milliGRAM(s) Oral daily  sodium chloride 0.9% lock flush 3 milliLiter(s) IV Push every 8 hours    MEDICATIONS  (PRN):  dextrose 40% Gel 15 Gram(s) Oral once PRN Blood Glucose LESS THAN 70 milliGRAM(s)/deciliter  glucagon  Injectable 1 milliGRAM(s) IntraMuscular once PRN Glucose LESS THAN 70 milligrams/deciliter Patient is a 74y old  Male who presents with a chief complaint of hypoxia and fever (23 Apr 2019 18:27)    SUBJECTIVE / OVERNIGHT EVENTS: Patient seen and examined. No acute overnight events, no subjective complaints. Denies CP, SOB, worsening cough, hemoptysis, or fever/chills. Per RN, patient desaturated to 88% on RA, requiring 2L NC. This AM, pt satting 91-94% on 2 L NC. Glucose on BMP this AM 28, patient asymptomatic. Repeat FS was 44. Gave 1 AMP D50, will f/u repeat FS    OBJECTIVE:  Vital Signs Last 24 Hrs  T(C): 37.3 (24 Apr 2019 05:01), Max: 37.4 (23 Apr 2019 20:37)  T(F): 99.1 (24 Apr 2019 05:01), Max: 99.3 (23 Apr 2019 20:37)  HR: 72 (24 Apr 2019 05:01) (72 - 83)  BP: 115/57 (24 Apr 2019 05:01) (103/55 - 119/63)  BP(mean): --  RR: 18 (24 Apr 2019 05:01) (18 - 18)  SpO2: 96% (24 Apr 2019 05:01) (92% - 96%)    I&O's Summary    23 Apr 2019 07:01  -  24 Apr 2019 07:00  --------------------------------------------------------  IN: 870 mL / OUT: 350 mL / NET: 520 mL      Physical Examination:  GEN: thin man, NAD  PSYCH: A&Ox3, mood and affect appear appropriate   SKIN: intact, no e/o rash  NEURO: no focal neurologic deficits appreciated; CN II-XII intact, sensation intact B/L, motor 5/5 in all mm groups  EYES: PERRL, anicteric, EOMI, no vertical/horizontal nystagmus  HEAD: NC, AT  NECK: supple  RESPI: no accessory muscle use, inspiratory crackles noted b/l-more prominent in right lower lobes, no wheezing noted  CARDIO: S1 and S2, regular rate/rhythm, no LE edema B/L  ABD: soft, NT, ND, +BS  EXT: patient able to move all extremities spontaneously  VASC: peripheral pulses palpated, no LE edema     Labs:  CAPILLARY BLOOD GLUCOSE      POCT Blood Glucose.: 165 mg/dL (23 Apr 2019 22:11)  POCT Blood Glucose.: 143 mg/dL (23 Apr 2019 17:28)  POCT Blood Glucose.: 203 mg/dL (23 Apr 2019 12:21)  POCT Blood Glucose.: 126 mg/dL (23 Apr 2019 08:50)                          8.9    7.48  )-----------( 410      ( 23 Apr 2019 09:30 )             28.5     04-23    131<L>  |  95<L>  |  8   ----------------------------<  149<H>  3.9   |  24  |  0.71    Ca    8.6      23 Apr 2019 06:55              Culture - Sputum (collected 24 Apr 2019 01:16)  Source: .Sputum  Gram Stain (24 Apr 2019 06:04):    Few Squamous epithelial cells per low power field    Few polymorphonuclear leukocytes per low power field    Few Yeast like cells per oil power field    Rare Gram variable coccobacilli     per oil power field    Culture - Blood (collected 22 Apr 2019 12:23)  Source: .Blood  Preliminary Report (23 Apr 2019 13:01):    No growth to date.    Culture - Blood (collected 22 Apr 2019 12:23)  Source: .Blood  Preliminary Report (23 Apr 2019 13:01):    No growth to date.        Imaging Personally Reviewed:    Consultant(s) Notes Reviewed:   Care Discussed with Consultants/Other Providers:    MEDICATIONS  (STANDING):  amiKACIN  IVPB 400 milliGRAM(s) IV Intermittent <User Schedule>  dextrose 5%. 1000 milliLiter(s) (50 mL/Hr) IV Continuous <Continuous>  dextrose 50% Injectable 12.5 Gram(s) IV Push once  dextrose 50% Injectable 25 Gram(s) IV Push once  dextrose 50% Injectable 25 Gram(s) IV Push once  ertapenem  IVPB      ertapenem  IVPB 1000 milliGRAM(s) IV Intermittent every 24 hours  ethambutol 800 milliGRAM(s) Oral daily  heparin  Injectable 5000 Unit(s) SubCutaneous every 8 hours  insulin glargine Injectable (LANTUS) 13 Unit(s) SubCutaneous at bedtime  insulin lispro (HumaLOG) corrective regimen sliding scale   SubCutaneous three times a day before meals  insulin lispro (HumaLOG) corrective regimen sliding scale   SubCutaneous at bedtime  rifampin 600 milliGRAM(s) Oral daily  sodium chloride 0.9% lock flush 3 milliLiter(s) IV Push every 8 hours    MEDICATIONS  (PRN):  dextrose 40% Gel 15 Gram(s) Oral once PRN Blood Glucose LESS THAN 70 milliGRAM(s)/deciliter  glucagon  Injectable 1 milliGRAM(s) IntraMuscular once PRN Glucose LESS THAN 70 milligrams/deciliter

## 2019-04-24 NOTE — CONSULT NOTE ADULT - SUBJECTIVE AND OBJECTIVE BOX
HPI:  The patient is a 73 yo male with a PMH of T2DM (A1C 8.2), VILMA (dx 2018, on IV abx via PICC placed 04/19 at [facility]), TB (age 15, txtd with PCN), who presents to hospital after developing hypoglycemia at home. The patient reports that yesterday he went to bed around mid day and his daughter and wife called 911 when they could not arouse him. He was found to have a FSG of 27 and was given d50 and juice. He reports he felt back to normal and declined to go to the hospital however a few hours later he became lethargic again and was brought to the ED by EMS because of repeated hypoglycemia. In the ED he denies any active complaints. He reports he took his insulin victoza and metformin yesterday AM and then went to [facility] where he had pancakes sausages and eggs. For lunch he had a light meal of cereal with bananas. He reports that he administered his abx and then went to lie down to clear out of the living where his nieces and nephews were playing. He reports he was recently in [facility] 4/9  - 4/17 for MAC and had a PICC placed which he has been using to give himself abx at home. He follows closely with his pulm Dr Maurice. He is in the process of switching endocrinologist.     In the ED vitals signs: Temp 102, 84, 97/53, 19, 88% on room air. The patient was started on a D10 drip infusion in the ED which has been discontinued. The patient received IV amikacin, IV ertapenem, PO ethambutol and PO rifampin. The patient received acetaminophen and potassium. He was initially seen by MICU for hypoglycemia however not accepted as resolved. (23 Apr 2019 00:07)    Endocrine History:  Patient with a hx of Type 2 DM for past 2-3 years. Currently on metformin 500mg daily, vicotoza 1.8mg, and tresiba 22 units that takes in AM. Patient reports has been on this stable regimen for past 1 year approx. Checks FS in AM and sometimes before meals, range 130s-140s in AM, pre-prandials up to 200. Reports only rare hypoglycemia 1X/month approx. Patient reports last took his tresiba was AM prior to admission. Reports for past few weeks with recent hospitalizations has not been eating as well. Today in AM, CMP with BG 28, FS 44. Patient reports he had no symptoms of hypoglycemia. As per nurse, at the time patient almost fell walking to bathroom. Follows with optho, no hx of retinopathy. Denies neuropathy. Says hasnt followed with an endocrinologist recently, but was actually supposed to have an appt with a new one this week. (does not recall name)    PAST MEDICAL & SURGICAL HISTORY:  Type 2 diabetes mellitus with complication, without long-term current use of insulin  Mycobacterium avium-intracellulare complex  No significant past surgical history      FAMILY HISTORY: No family hx of Type 2 DM      Social History: used to smoke as teenager only. Social alcohol use    Outpatient Medications:  meformin  victoza  tresiba 22 units qhs  levaquin  rifampin  ethambutol    MEDICATIONS  (STANDING):  ALBUTerol/ipratropium for Nebulization 3 milliLiter(s) Nebulizer every 6 hours  amiKACIN  IVPB 400 milliGRAM(s) IV Intermittent <User Schedule>  buDESOnide 160 MICROgram(s)/formoterol 4.5 MICROgram(s) Inhaler 2 Puff(s) Inhalation two times a day  dextrose 5%. 1000 milliLiter(s) (50 mL/Hr) IV Continuous <Continuous>  dextrose 50% Injectable 12.5 Gram(s) IV Push once  dextrose 50% Injectable 25 Gram(s) IV Push once  dextrose 50% Injectable 25 Gram(s) IV Push once  docusate sodium 100 milliGRAM(s) Oral daily  ertapenem  IVPB 1000 milliGRAM(s) IV Intermittent every 24 hours  ertapenem  IVPB      ethambutol 800 milliGRAM(s) Oral daily  heparin  Injectable 5000 Unit(s) SubCutaneous every 8 hours  rifampin 600 milliGRAM(s) Oral daily  senna 2 Tablet(s) Oral at bedtime  simvastatin 20 milliGRAM(s) Oral at bedtime  sodium chloride 0.9% lock flush 3 milliLiter(s) IV Push every 8 hours    MEDICATIONS  (PRN):  ALBUTerol    90 MICROgram(s) HFA Inhaler 1 Puff(s) Inhalation every 4 hours PRN Shortness of Breath and/or Wheezing  dextrose 40% Gel 15 Gram(s) Oral once PRN Blood Glucose LESS THAN 70 milliGRAM(s)/deciliter  dextrose 40% Gel 15 Gram(s) Oral once PRN Blood Glucose LESS THAN 70 milliGRAM(s)/deciliter  glucagon  Injectable 1 milliGRAM(s) IntraMuscular once PRN Glucose LESS THAN 70 milligrams/deciliter  polyethylene glycol 3350 17 Gram(s) Oral daily PRN Constipation      Allergies    No Known Allergies    Intolerances      Review of Systems:  Constitutional: No fever  Eyes: No blurry vision  Neuro: No tremors  HEENT: No pain  Cardiovascular: No chest pain, palpitations  Respiratory: No SOB, no cough  GI: No nausea, vomiting, abdominal pain  Skin: no rash  Endocrine: no polyuria, polydipsia  Hem/lymph: no swelling  Osteoporosis: no fractures    ALL OTHER SYSTEMS REVIEWED AND NEGATIVE      PHYSICAL EXAM:  VITALS: T(C): 38.2 (04-24-19 @ 11:30)  T(F): 100.7 (04-24-19 @ 11:30), Max: 100.7 (04-24-19 @ 11:30)  HR: 83 (04-24-19 @ 11:30) (72 - 83)  BP: 107/58 (04-24-19 @ 11:30) (107/58 - 119/63)  RR:  (18 - 18)  SpO2:  (92% - 96%)  Wt(kg): --  GENERAL: NAD, well-groomed, well-developed, thin  EYES: No proptosis, no lid lag, anicteric  HEENT:  Atraumatic, Normocephalic, moist mucous membranes  THYROID: Normal size, no palpable nodules  RESPIRATORY: Clear to auscultation bilaterally; No rales, rhonchi, wheezing  CARDIOVASCULAR: Regular rate and rhythm; No murmurs; no peripheral edema  GI: Soft, nontender, non distended, normal bowel sounds  SKIN: Dry, intact, No rashes or lesions  MUSCULOSKELETAL: Full range of motion, normal strength  NEURO: sensation intact, extraocular movements intact, no tremor  PSYCH: Alert and oriented x 3, normal affect, normal mood  CUSHING'S SIGNS: no striae    POCT Blood Glucose.: 187 mg/dL (04-24-19 @ 12:26)  POCT Blood Glucose.: 155 mg/dL (04-24-19 @ 08:52)  POCT Blood Glucose.: 43 mg/dL (04-24-19 @ 08:18)  POCT Blood Glucose.: 44 mg/dL (04-24-19 @ 08:17)  POCT Blood Glucose.: 165 mg/dL (04-23-19 @ 22:11)  POCT Blood Glucose.: 143 mg/dL (04-23-19 @ 17:28)  POCT Blood Glucose.: 203 mg/dL (04-23-19 @ 12:21)  POCT Blood Glucose.: 126 mg/dL (04-23-19 @ 08:50)  POCT Blood Glucose.: 140 mg/dL (04-23-19 @ 04:05)  POCT Blood Glucose.: 148 mg/dL (04-23-19 @ 00:24)  POCT Blood Glucose.: 147 mg/dL (04-22-19 @ 21:53)  POCT Blood Glucose.: 116 mg/dL (04-22-19 @ 18:01)  POCT Blood Glucose.: 177 mg/dL (04-22-19 @ 16:07)  POCT Blood Glucose.: 152 mg/dL (04-22-19 @ 11:57)  POCT Blood Glucose.: 155 mg/dL (04-22-19 @ 11:18)  POCT Blood Glucose.: 208 mg/dL (04-22-19 @ 09:50)  POCT Blood Glucose.: 151 mg/dL (04-22-19 @ 08:23)  POCT Blood Glucose.: 117 mg/dL (04-22-19 @ 07:28)  POCT Blood Glucose.: 140 mg/dL (04-22-19 @ 06:38)  POCT Blood Glucose.: 57 mg/dL (04-22-19 @ 06:04)  POCT Blood Glucose.: 78 mg/dL (04-22-19 @ 05:26)  POCT Blood Glucose.: 120 mg/dL (04-22-19 @ 05:01)  POCT Blood Glucose.: 59 mg/dL (04-22-19 @ 04:11)                            8.8    9.28  )-----------( 455      ( 24 Apr 2019 10:53 )             28.3       04-24    132<L>  |  95<L>  |  10  ----------------------------<  28<LL>  3.3<L>   |  25  |  0.77    EGFR if : 104  EGFR if non : 90    Ca    8.9      04-24  Mg     1.7     04-24  Phos  3.5     04-24    TPro  6.6  /  Alb  2.6<L>  /  TBili  0.2  /  DBili  x   /  AST  20  /  ALT  10  /  AlkPhos  56  04-22        Hemoglobin A1C, Whole Blood: 8.2 % <H> [4.0 - 5.6] (04-22-19 @ 22:30) HPI:  The patient is a 75 yo male with a PMH of T2DM (A1C 8.2), VILMA (dx 2018, on IV abx via PICC placed 04/19 at [facility]), TB (age 15, txtd with PCN), who presents to hospital after developing hypoglycemia at home. The patient reports that yesterday he went to bed around mid day and his daughter and wife called 911 when they could not arouse him. He was found to have a FSG of 27 and was given d50 and juice (April 22, 2019). He reports he felt back to normal and declined to go to the hospital however a few hours later he became lethargic again and was brought to the ED by EMS because of repeated hypoglycemia. In the ED he denies any active complaints. He reports he took his insulin victoza and metformin yesterday AM (04/21) and then went to [facility] where he had pancakes sausages and eggs. For lunch he had a light meal of cereal with bananas. He reports that he administered his abx and then went to lie down to clear out of the living where his nieces and nephews were playing. He reports he was recently in [facility] 4/9  - 4/17 for MAC and had a PICC placed which he has been using to give himself abx at home. He follows closely with his pulm Dr Maurice. He is in the process of switching endocrinologist.     In the ED vitals signs: Temp 102, 84, 97/53, 19, 88% on room air. The patient was started on a D10 drip infusion in the ED which has been discontinued. The patient received IV amikacin, IV ertapenem, PO ethambutol and PO rifampin. The patient received acetaminophen and potassium. He was initially seen by MICU for hypoglycemia however not accepted as resolved. (23 Apr 2019 00:07)    Endocrine History:  Patient with a hx of Type 2 DM for past 2-3 years. Currently on metformin 500mg daily, vicotoza 1.8mg, and tresiba 22 units that takes in AM. Patient reports has been on this stable regimen for past 1 year approx. Checks FS in AM and sometimes before meals, range 130s-140s in AM, pre-prandials up to 200. Reports only rare hypoglycemia 1X/month approx. Patient reports last took his tresiba was AM prior to admission. Reports for past few weeks with recent hospitalizations has not been eating as well. Today in AM, CMP with BG 28, FS 44. Patient reports he had no symptoms of hypoglycemia. As per nurse, at the time patient almost fell walking to bathroom. Follows with optho, no hx of retinopathy. Denies neuropathy. Says hasnt followed with an endocrinologist recently, but was actually supposed to have an appt with a new one this week. (does not recall name)    PAST MEDICAL & SURGICAL HISTORY:  Type 2 diabetes mellitus with complication, without long-term current use of insulin  Mycobacterium avium-intracellulare complex  No significant past surgical history      FAMILY HISTORY: No family hx of Type 2 DM      Social History: used to smoke as teenager only. Social alcohol use    Outpatient Medications:  meformin  victoza  tresiba 22 units qhs  levaquin  rifampin  ethambutol    MEDICATIONS  (STANDING):  ALBUTerol/ipratropium for Nebulization 3 milliLiter(s) Nebulizer every 6 hours  amiKACIN  IVPB 400 milliGRAM(s) IV Intermittent <User Schedule>  buDESOnide 160 MICROgram(s)/formoterol 4.5 MICROgram(s) Inhaler 2 Puff(s) Inhalation two times a day  dextrose 5%. 1000 milliLiter(s) (50 mL/Hr) IV Continuous <Continuous>  dextrose 50% Injectable 12.5 Gram(s) IV Push once  dextrose 50% Injectable 25 Gram(s) IV Push once  dextrose 50% Injectable 25 Gram(s) IV Push once  docusate sodium 100 milliGRAM(s) Oral daily  ertapenem  IVPB 1000 milliGRAM(s) IV Intermittent every 24 hours  ertapenem  IVPB      ethambutol 800 milliGRAM(s) Oral daily  heparin  Injectable 5000 Unit(s) SubCutaneous every 8 hours  rifampin 600 milliGRAM(s) Oral daily  senna 2 Tablet(s) Oral at bedtime  simvastatin 20 milliGRAM(s) Oral at bedtime  sodium chloride 0.9% lock flush 3 milliLiter(s) IV Push every 8 hours    MEDICATIONS  (PRN):  ALBUTerol    90 MICROgram(s) HFA Inhaler 1 Puff(s) Inhalation every 4 hours PRN Shortness of Breath and/or Wheezing  dextrose 40% Gel 15 Gram(s) Oral once PRN Blood Glucose LESS THAN 70 milliGRAM(s)/deciliter  dextrose 40% Gel 15 Gram(s) Oral once PRN Blood Glucose LESS THAN 70 milliGRAM(s)/deciliter  glucagon  Injectable 1 milliGRAM(s) IntraMuscular once PRN Glucose LESS THAN 70 milligrams/deciliter  polyethylene glycol 3350 17 Gram(s) Oral daily PRN Constipation      Allergies    No Known Allergies    Intolerances      Review of Systems:  Constitutional: No fever  Eyes: No blurry vision  Neuro: No tremors  HEENT: No pain  Cardiovascular: No chest pain, palpitations  Respiratory: No SOB, no cough  GI: No nausea, vomiting, abdominal pain  Skin: no rash  Endocrine: no polyuria, polydipsia  Hem/lymph: no swelling  Osteoporosis: no fractures    ALL OTHER SYSTEMS REVIEWED AND NEGATIVE      PHYSICAL EXAM:  VITALS: T(C): 38.2 (04-24-19 @ 11:30)  T(F): 100.7 (04-24-19 @ 11:30), Max: 100.7 (04-24-19 @ 11:30)  HR: 83 (04-24-19 @ 11:30) (72 - 83)  BP: 107/58 (04-24-19 @ 11:30) (107/58 - 119/63)  RR:  (18 - 18)  SpO2:  (92% - 96%)  Wt(kg): --  GENERAL: NAD, well-groomed, well-developed, thin  EYES: No proptosis, no lid lag, anicteric  HEENT:  Atraumatic, Normocephalic, moist mucous membranes  THYROID: Normal size, no palpable nodules  RESPIRATORY: Clear to auscultation bilaterally; No rales, rhonchi, wheezing  CARDIOVASCULAR: Regular rate and rhythm; No murmurs; no peripheral edema  GI: Soft, nontender, non distended, normal bowel sounds  SKIN: Dry, intact, No rashes or lesions  MUSCULOSKELETAL: Full range of motion, normal strength  NEURO: sensation intact, extraocular movements intact, no tremor  PSYCH: Alert and oriented x 3, normal affect, normal mood  CUSHING'S SIGNS: no striae    POCT Blood Glucose.: 187 mg/dL (04-24-19 @ 12:26)  POCT Blood Glucose.: 155 mg/dL (04-24-19 @ 08:52)  POCT Blood Glucose.: 43 mg/dL (04-24-19 @ 08:18)  POCT Blood Glucose.: 44 mg/dL (04-24-19 @ 08:17)    POCT Blood Glucose.: 165 mg/dL (04-23-19 @ 22:11) Lantus 13  POCT Blood Glucose.: 143 mg/dL (04-23-19 @ 17:28)  POCT Blood Glucose.: 203 mg/dL (04-23-19 @ 12:21)  POCT Blood Glucose.: 126 mg/dL (04-23-19 @ 08:50)  POCT Blood Glucose.: 140 mg/dL (04-23-19 @ 04:05)  POCT Blood Glucose.: 148 mg/dL (04-23-19 @ 00:24)    POCT Blood Glucose.: 147 mg/dL (04-22-19 @ 21:53) Lantus 13  POCT Blood Glucose.: 116 mg/dL (04-22-19 @ 18:01)  POCT Blood Glucose.: 177 mg/dL (04-22-19 @ 16:07)  POCT Blood Glucose.: 152 mg/dL (04-22-19 @ 11:57)  POCT Blood Glucose.: 155 mg/dL (04-22-19 @ 11:18)  POCT Blood Glucose.: 208 mg/dL (04-22-19 @ 09:50)  POCT Blood Glucose.: 151 mg/dL (04-22-19 @ 08:23)  POCT Blood Glucose.: 117 mg/dL (04-22-19 @ 07:28)  POCT Blood Glucose.: 140 mg/dL (04-22-19 @ 06:38)  POCT Blood Glucose.: 57 mg/dL (04-22-19 @ 06:04)  POCT Blood Glucose.: 78 mg/dL (04-22-19 @ 05:26)  POCT Blood Glucose.: 120 mg/dL (04-22-19 @ 05:01)  POCT Blood Glucose.: 59 mg/dL (04-22-19 @ 04:11)    Tresiba 22 units on 4/21 AM                          8.8    9.28  )-----------( 455      ( 24 Apr 2019 10:53 )             28.3       04-24    132<L>  |  95<L>  |  10  ----------------------------<  28<LL>  3.3<L>   |  25  |  0.77    EGFR if : 104  EGFR if non : 90    Ca    8.9      04-24  Mg     1.7     04-24  Phos  3.5     04-24    TPro  6.6  /  Alb  2.6<L>  /  TBili  0.2  /  DBili  x   /  AST  20  /  ALT  10  /  AlkPhos  56  04-22        Hemoglobin A1C, Whole Blood: 8.2 % <H> [4.0 - 5.6] (04-22-19 @ 22:30)

## 2019-04-24 NOTE — PROGRESS NOTE ADULT - ASSESSMENT
73 yo male with a PMH of T2DM (on insulin, Victoza and metformin), VILMA (dx 2018, on IV abx via PICC placed 04/19 at [facility]), who presents to hospital after developing hypoglycemia at home. He had been diagnosed with MAC infection in [facility] (bronch done to r/o lung ca and bal cx with VILMA) and discharged on 4/17 with PICC line. He had been administering Amikacin 3 times a week  and ertapenem daily and taking rifampin and ethambutol pills as directed.   He denied increased cough, SOB. Denied diarrhea, dysuria, abdominal pain and fever. Febrile in hospital, Tmax 102 in ED. RVP positive for adenovirus. Chest X ray with patchy opacities (c/w given h/o VILMA) superimposed upon pulmonary emphysema. No leukocytosis.   Spoke to patient's pulmonologist and clarified medications.    Fever - likely from underlying adenovirus infection, Less likely pneumonia   VILMA infection (lungs) in the setting of copd/emphysema  ?episodes of desaturation       Suggest: (to be discussed)    *desaturation on room air can be multifactorial from adenovirus, VILMA infection, COPD    low suspicion for superimposed pneumonia    c/w to monitor off antibiotics     * c/w regimen for VILMA:     Ertapenem 1g/day,    Amikacin 8mg/kg  3times/week Mond/Wed/Frid    Rifampin 600 po daily     Ethambutol 800 Po daily 73 yo male with a PMH of T2DM (on insulin, Victoza and metformin), VILMA (dx 2018, on IV abx via PICC placed 04/19 at [facility]), who presents to hospital after developing hypoglycemia at home. He had been diagnosed with MAC infection in [facility] (bronch done to r/o lung ca and bal cx with VILMA) and discharged on 4/17 with PICC line. He had been administering Amikacin 3 times a week  and ertapenem daily and taking rifampin and ethambutol pills as directed.   He denied increased cough, SOB. Denied diarrhea, dysuria, abdominal pain and fever. Febrile in hospital, Tmax 102 in ED. RVP positive for adenovirus. Chest X ray with patchy opacities (c/w given h/o VILMA) superimposed upon pulmonary emphysema. No leukocytosis.   Spoke to patient's pulmonologist and clarified medications.    Fever - likely from underlying adenovirus infection, Less likely pneumonia   VILMA infection (lungs) in the setting of copd/emphysema  ?episodes of desaturation     Suggest: (to be discussed)    *desaturation on room air can be multifactorial from adenovirus, VILMA infection, COPD    low suspicion for superimposed pneumonia    c/w to monitor off antibiotics     * c/w regimen for VILMA:     Ertapenem 1g/day,    Amikacin 8mg/kg  3times/week Mond/Wed/Frid    Rifampin 600 po daily     Ethambutol 800 Po daily

## 2019-04-24 NOTE — PROGRESS NOTE ADULT - ASSESSMENT
The patient is a 75 yo male with a PMH of T2DM (on insulin 20 units AM (tresiba), victoza and metformin), VILMA (dx 2018, on IV abx via PICC placed 04/19 at [facility]), TB (age 15, txtd with PCN), who presents to hospital for symptomatic hypoglycemia 2/2 poor PO intake that has since resolved. At presentation, patient was found to be febrile to 102 and in hypoxic respiratory distress 2/2 + adenovirus URI

## 2019-04-24 NOTE — PROGRESS NOTE ADULT - PROBLEM SELECTOR PLAN 3
-Presented for symptomatic hypoglycemic episode x2 s/p D10. Patient seen by MICU but FS improved  -Blood glucose on [facility] this AM 28, pt asymptomatic;  will repeat FS STAT  -will monitor FSG q4 and before meals -Presented for symptomatic hypoglycemic episode x2 s/p D10. Patient seen by MICU but FS improved  -Blood glucose on BMP this AM 28, pt asymptomatic;  Repeat FS 44, s/p 1 AMP D50. Repeat FS  -will monitor FSG q4 and before meals -Presented for symptomatic hypoglycemic episode x2 s/p D10. Patient seen by MICU but FS improved  -Blood glucose on [facility] this AM 28, pt asymptomatic;  Repeat FS 44, s/p 1 AMP D50. Repeat FS corrected to 150s  -Endocrine consulted  -will monitor FSG q4 and before meals -Presented for symptomatic hypoglycemic episode x2 s/p D10. Patient seen by MICU but FS improved and basal insulin was reduced  -Blood glucose on BMP this AM 28, pt asymptomatic;  Repeat FS 44, s/p 1 AMP D50. Repeat FS corrected to 150s  -Endocrine follow up  -will monitor FSG q4 and before meals

## 2019-04-24 NOTE — PROGRESS NOTE ADULT - SUBJECTIVE AND OBJECTIVE BOX
Follow Up:  Patient is seen for follow up of known VILMA infection (on treatment) and adenovirus infection.     Interval History/ROS: Patient reports he feels the same. Per team, patient had episodes of desaturation     Allergies  No Known Allergies    ANTIMICROBIALS:  amiKACIN  IVPB 400 <User Schedule>  ertapenem  IVPB 1000 every 24 hours  ertapenem  IVPB    ethambutol 800 daily  rifampin 600 daily    OTHER MEDS:  MEDICATIONS  (STANDING):  ALBUTerol    90 MICROgram(s) HFA Inhaler 1 every 4 hours PRN  ALBUTerol/ipratropium for Nebulization 3 every 6 hours  buDESOnide 160 MICROgram(s)/formoterol 4.5 MICROgram(s) Inhaler 2 two times a day  dextrose 40% Gel 15 once PRN  dextrose 40% Gel 15 once PRN  dextrose 50% Injectable 12.5 once  dextrose 50% Injectable 25 once  dextrose 50% Injectable 25 once  docusate sodium 100 daily  glucagon  Injectable 1 once PRN  heparin  Injectable 5000 every 8 hours  insulin lispro (HumaLOG) corrective regimen sliding scale  three times a day before meals  insulin lispro (HumaLOG) corrective regimen sliding scale  at bedtime  polyethylene glycol 3350 17 daily PRN  senna 2 at bedtime  simvastatin 20 at bedtime      Vital Signs Last 24 Hrs  T(C): 36.9 (24 Apr 2019 09:54), Max: 37.4 (23 Apr 2019 20:37)  T(F): 98.5 (24 Apr 2019 09:54), Max: 99.3 (23 Apr 2019 20:37)  HR: 78 (24 Apr 2019 09:54) (72 - 83)  BP: 114/57 (24 Apr 2019 09:54) (103/55 - 119/63)  BP(mean): --  RR: 18 (24 Apr 2019 09:54) (18 - 18)  SpO2: 92% (24 Apr 2019 09:54) (92% - 96%)    PHYSICAL EXAM:  General: non-toxic, on nasal canula   HEAD/EYES: anicteric, PERRL  ENT:  supple  Cardiovascular:   S1, S2  Respiratory:  clear bilaterally  GI:  soft, non-tender, normal bowel sounds  :  no CVA tenderness   Musculoskeletal:  no synovitis  Neurologic:  grossly non-focal  Skin:  no rash  Lymph: no lymphadenopathy  Psychiatric:  appropriate affect  Vascular:  no phlebitis                          8.8    9.28  )-----------( 455      ( 24 Apr 2019 10:53 )             28.3       04-24    132<L>  |  95<L>  |  10  ----------------------------<  28<LL>  3.3<L>   |  25  |  0.77    Ca    8.9      24 Apr 2019 07:16  Phos  3.5     04-24  Mg     1.7     04-24      MICROBIOLOGY:  v  .Sputum  04-24-19 --  --    Few Squamous epithelial cells per low power field  Few polymorphonuclear leukocytes per low power field  Few Yeast like cells per oil power field  Rare Gram variable coccobacilli   per oil power field    .Urine  04-23-19   No growth  --  --    .Blood  04-22-19   No growth to date.  --  --    Rapid RVP Result: Detected (04-22 @ 22:00)    RADIOLOGY:  < from: Xray Chest 1 View AP/PA (04.22.19 @ 06:14) >    IMPRESSION:    Patchy opacities bilaterally superimposed upon pulmonary emphysema with   right apical bullae. Follow Up:  Patient is seen for follow up of known VILMA infection (on treatment) and adenovirus infection.     Interval History/ROS: Patient reports he feels the same. Per team, patient had episodes of desaturation     Allergies  No Known Allergies    ANTIMICROBIALS:  amiKACIN  IVPB 400 <User Schedule>  ertapenem  IVPB 1000 every 24 hours  ertapenem  IVPB    ethambutol 800 daily  rifampin 600 daily    Vital Signs Last 24 Hrs  T(C): 36.9 (24 Apr 2019 09:54), Max: 37.4 (23 Apr 2019 20:37)  T(F): 98.5 (24 Apr 2019 09:54), Max: 99.3 (23 Apr 2019 20:37)  HR: 78 (24 Apr 2019 09:54) (72 - 83)  BP: 114/57 (24 Apr 2019 09:54) (103/55 - 119/63)  RR: 18 (24 Apr 2019 09:54) (18 - 18)  SpO2: 92% (24 Apr 2019 09:54) (92% - 96%)    PHYSICAL EXAM:  General: non-toxic, on nasal canula   HEAD/EYES: anicteric, PERRL  ENT:  supple  Cardiovascular:   S1, S2  Respiratory:  clear bilaterally  GI:  soft, non-tender, normal bowel sounds  :  no CVA tenderness   Musculoskeletal:  no synovitis  Neurologic:  grossly non-focal  Skin:  no rash  Lymph: no lymphadenopathy  Psychiatric:  appropriate affect  Vascular:  no phlebitis    Labs:                        8.8    9.28  )-----------( 455      ( 24 Apr 2019 10:53 )             28.3     04-24    132<L>  |  95<L>  |  10  ----------------------------<  28<LL>  3.3<L>   |  25  |  0.77    Ca    8.9      24 Apr 2019 07:16  Phos  3.5     04-24  Mg     1.7     04-24    MICROBIOLOGY:    .Sputum  04-24-19 --  --    Few Squamous epithelial cells per low power field  Few polymorphonuclear leukocytes per low power field  Few Yeast like cells per oil power field  Rare Gram variable coccobacilli   per oil power field    .Urine  04-23-19   No growth    .Blood  04-22-19   No growth to date.     Rapid RVP Result: Detected (04-22 @ 22:00) [place]    RADIOLOGY:  < from: Xray Chest 1 View AP/PA (04.22.19 @ 06:14) >    IMPRESSION:    Patchy opacities bilaterally superimposed upon pulmonary emphysema with   right apical bullae.

## 2019-04-25 LAB
ACTH SER-ACNC: 13.7 PG/ML — SIGNIFICANT CHANGE UP (ref 7.2–63.3)
ANION GAP SERPL CALC-SCNC: 12 MMOL/L — SIGNIFICANT CHANGE UP (ref 5–17)
BUN SERPL-MCNC: 11 MG/DL — SIGNIFICANT CHANGE UP (ref 7–23)
C PEPTIDE SERPL-MCNC: 2.4 NG/ML — SIGNIFICANT CHANGE UP (ref 1.1–4.4)
CALCIUM SERPL-MCNC: 8.6 MG/DL — SIGNIFICANT CHANGE UP (ref 8.4–10.5)
CHLORIDE SERPL-SCNC: 97 MMOL/L — SIGNIFICANT CHANGE UP (ref 96–108)
CO2 SERPL-SCNC: 23 MMOL/L — SIGNIFICANT CHANGE UP (ref 22–31)
CORTIS AM PEAK SERPL-MCNC: 15 UG/DL — SIGNIFICANT CHANGE UP (ref 6–18.4)
CREAT SERPL-MCNC: 0.77 MG/DL — SIGNIFICANT CHANGE UP (ref 0.5–1.3)
CULTURE RESULTS: SIGNIFICANT CHANGE UP
DENV1 AB SER-ACNC: 37.7 UG/DL — SIGNIFICANT CHANGE UP (ref 28–175)
GLUCOSE BLDC GLUCOMTR-MCNC: 163 MG/DL — HIGH (ref 70–99)
GLUCOSE BLDC GLUCOMTR-MCNC: 183 MG/DL — HIGH (ref 70–99)
GLUCOSE BLDC GLUCOMTR-MCNC: 184 MG/DL — HIGH (ref 70–99)
GLUCOSE BLDC GLUCOMTR-MCNC: 185 MG/DL — HIGH (ref 70–99)
GLUCOSE BLDC GLUCOMTR-MCNC: 205 MG/DL — HIGH (ref 70–99)
GLUCOSE SERPL-MCNC: 178 MG/DL — HIGH (ref 70–99)
HCT VFR BLD CALC: 25.5 % — LOW (ref 39–50)
HGB BLD-MCNC: 7.9 G/DL — LOW (ref 13–17)
MAGNESIUM SERPL-MCNC: 1.8 MG/DL — SIGNIFICANT CHANGE UP (ref 1.6–2.6)
MCHC RBC-ENTMCNC: 24.5 PG — LOW (ref 27–34)
MCHC RBC-ENTMCNC: 31 GM/DL — LOW (ref 32–36)
MCV RBC AUTO: 78.9 FL — LOW (ref 80–100)
PHOSPHATE SERPL-MCNC: 3.4 MG/DL — SIGNIFICANT CHANGE UP (ref 2.5–4.5)
PLATELET # BLD AUTO: 360 K/UL — SIGNIFICANT CHANGE UP (ref 150–400)
POTASSIUM SERPL-MCNC: 4.3 MMOL/L — SIGNIFICANT CHANGE UP (ref 3.5–5.3)
POTASSIUM SERPL-SCNC: 4.3 MMOL/L — SIGNIFICANT CHANGE UP (ref 3.5–5.3)
RBC # BLD: 3.23 M/UL — LOW (ref 4.2–5.8)
RBC # FLD: 17.3 % — HIGH (ref 10.3–14.5)
SODIUM SERPL-SCNC: 132 MMOL/L — LOW (ref 135–145)
SPECIMEN SOURCE: SIGNIFICANT CHANGE UP
TSH SERPL-MCNC: 1.39 UIU/ML — SIGNIFICANT CHANGE UP (ref 0.27–4.2)
WBC # BLD: 5.13 K/UL — SIGNIFICANT CHANGE UP (ref 3.8–10.5)
WBC # FLD AUTO: 5.13 K/UL — SIGNIFICANT CHANGE UP (ref 3.8–10.5)

## 2019-04-25 PROCEDURE — 99232 SBSQ HOSP IP/OBS MODERATE 35: CPT | Mod: GC

## 2019-04-25 PROCEDURE — 99233 SBSQ HOSP IP/OBS HIGH 50: CPT | Mod: GC

## 2019-04-25 PROCEDURE — 99232 SBSQ HOSP IP/OBS MODERATE 35: CPT

## 2019-04-25 RX ORDER — ERTAPENEM SODIUM 1 G/1
1 INJECTION, POWDER, LYOPHILIZED, FOR SOLUTION INTRAMUSCULAR; INTRAVENOUS
Qty: 60 | Refills: 0 | OUTPATIENT
Start: 2019-04-25 | End: 2019-06-23

## 2019-04-25 RX ORDER — INSULIN LISPRO 100/ML
VIAL (ML) SUBCUTANEOUS AT BEDTIME
Qty: 0 | Refills: 0 | Status: DISCONTINUED | OUTPATIENT
Start: 2019-04-25 | End: 2019-04-26

## 2019-04-25 RX ORDER — INSULIN LISPRO 100/ML
VIAL (ML) SUBCUTANEOUS
Qty: 0 | Refills: 0 | Status: DISCONTINUED | OUTPATIENT
Start: 2019-04-25 | End: 2019-04-26

## 2019-04-25 RX ORDER — AMIKACIN SULFATE 250 MG/ML
400 INJECTION, SOLUTION INTRAMUSCULAR; INTRAVENOUS
Qty: 5.2 | Refills: 0 | OUTPATIENT
Start: 2019-04-25 | End: 2019-05-07

## 2019-04-25 RX ADMIN — Medication 3 MILLILITER(S): at 05:18

## 2019-04-25 RX ADMIN — BUDESONIDE AND FORMOTEROL FUMARATE DIHYDRATE 2 PUFF(S): 160; 4.5 AEROSOL RESPIRATORY (INHALATION) at 05:18

## 2019-04-25 RX ADMIN — Medication 1: at 14:33

## 2019-04-25 RX ADMIN — CHLORHEXIDINE GLUCONATE 1 APPLICATION(S): 213 SOLUTION TOPICAL at 17:31

## 2019-04-25 RX ADMIN — BUDESONIDE AND FORMOTEROL FUMARATE DIHYDRATE 2 PUFF(S): 160; 4.5 AEROSOL RESPIRATORY (INHALATION) at 17:35

## 2019-04-25 RX ADMIN — HEPARIN SODIUM 5000 UNIT(S): 5000 INJECTION INTRAVENOUS; SUBCUTANEOUS at 13:29

## 2019-04-25 RX ADMIN — Medication 1: at 17:41

## 2019-04-25 RX ADMIN — SENNA PLUS 2 TABLET(S): 8.6 TABLET ORAL at 21:35

## 2019-04-25 RX ADMIN — ETHAMBUTOL HYDROCHLORIDE 800 MILLIGRAM(S): 400 TABLET, FILM COATED ORAL at 13:29

## 2019-04-25 RX ADMIN — Medication 3 MILLILITER(S): at 17:34

## 2019-04-25 RX ADMIN — ERTAPENEM SODIUM 120 MILLIGRAM(S): 1 INJECTION, POWDER, LYOPHILIZED, FOR SOLUTION INTRAMUSCULAR; INTRAVENOUS at 13:29

## 2019-04-25 RX ADMIN — HEPARIN SODIUM 5000 UNIT(S): 5000 INJECTION INTRAVENOUS; SUBCUTANEOUS at 21:35

## 2019-04-25 RX ADMIN — HEPARIN SODIUM 5000 UNIT(S): 5000 INJECTION INTRAVENOUS; SUBCUTANEOUS at 05:16

## 2019-04-25 RX ADMIN — Medication 1: at 08:54

## 2019-04-25 RX ADMIN — SODIUM CHLORIDE 3 MILLILITER(S): 9 INJECTION INTRAMUSCULAR; INTRAVENOUS; SUBCUTANEOUS at 04:51

## 2019-04-25 RX ADMIN — SODIUM CHLORIDE 3 MILLILITER(S): 9 INJECTION INTRAMUSCULAR; INTRAVENOUS; SUBCUTANEOUS at 14:08

## 2019-04-25 RX ADMIN — Medication 3 MILLILITER(S): at 13:28

## 2019-04-25 RX ADMIN — SODIUM CHLORIDE 3 MILLILITER(S): 9 INJECTION INTRAMUSCULAR; INTRAVENOUS; SUBCUTANEOUS at 21:27

## 2019-04-25 RX ADMIN — Medication 100 MILLIGRAM(S): at 13:28

## 2019-04-25 RX ADMIN — SIMVASTATIN 20 MILLIGRAM(S): 20 TABLET, FILM COATED ORAL at 21:35

## 2019-04-25 NOTE — PROGRESS NOTE ADULT - SUBJECTIVE AND OBJECTIVE BOX
Patient is a 74y old  Male who presents with a chief complaint of hypoxia and fever (24 Apr 2019 15:45)    Cecygeoffrey Chamberlain  Internal Medicine PGY-1  Pager # [phone]    SUBJECTIVE / OVERNIGHT EVENTS: Overnight, bedtime FS was 311 so 2 U humalog was given. 3 Am FS was 183. This AM, patient denies any SOB, CP, fever/chills, or dizziness but did express frustration in controlling his sugars. He states that he has been trying to eat more, including snacking on crackers, in light of his hypoglycemic episode yesterday.     OBJECTIVE:  Vital Signs Last 24 Hrs  T(C): 37.6 (25 Apr 2019 05:15), Max: 38.2 (24 Apr 2019 11:30)  T(F): 99.7 (25 Apr 2019 05:15), Max: 100.7 (24 Apr 2019 11:30)  HR: 75 (25 Apr 2019 05:15) (68 - 83)  BP: 100/53 (25 Apr 2019 05:15) (100/53 - 114/57)  BP(mean): --  RR: 18 (25 Apr 2019 05:15) (18 - 18)  SpO2: 96% (25 Apr 2019 05:15) (92% - 96%)    I&O's Summary    24 Apr 2019 07:01  -  25 Apr 2019 07:00  --------------------------------------------------------  IN: 840 mL / OUT: 400 mL / NET: 440 mL      Physical Examination:  GEN: thin man, NAD  PSYCH: A&Ox3, mood and affect appear appropriate   SKIN: intact, no e/o rash  NEURO: no focal neurologic deficits appreciated; CN II-XII intact, sensation intact B/L, motor 5/5 in all mm groups  EYES: PERRL, anicteric, EOMI, no vertical/horizontal nystagmus  HEAD: NC, AT  NECK: supple  RESPI: no accessory muscle use, inspiratory crackles noted b/l-more prominent in right lower lobes, no wheezing noted  CARDIO: S1 and S2, regular rate/rhythm, no LE edema B/L  ABD: soft, NT, ND, +BS  EXT: patient able to move all extremities spontaneously  VASC: peripheral pulses palpated, no LE edema       Labs:  CAPILLARY BLOOD GLUCOSE      POCT Blood Glucose.: 183 mg/dL (25 Apr 2019 03:15)  POCT Blood Glucose.: 311 mg/dL (24 Apr 2019 22:14)  POCT Blood Glucose.: 238 mg/dL (24 Apr 2019 17:30)  POCT Blood Glucose.: 187 mg/dL (24 Apr 2019 12:26)  POCT Blood Glucose.: 155 mg/dL (24 Apr 2019 08:52)  POCT Blood Glucose.: 43 mg/dL (24 Apr 2019 08:18)  POCT Blood Glucose.: 44 mg/dL (24 Apr 2019 08:17)                          8.8    9.28  )-----------( 455      ( 24 Apr 2019 10:53 )             28.3     04-24    132<L>  |  95<L>  |  10  ----------------------------<  28<LL>  3.3<L>   |  25  |  0.77    Ca    8.9      24 Apr 2019 07:16  Phos  3.5     04-24  Mg     1.7     04-24              Culture - Sputum (collected 24 Apr 2019 01:16)  Source: .Sputum  Gram Stain (24 Apr 2019 06:04):    Few Squamous epithelial cells per low power field    Few polymorphonuclear leukocytes per low power field    Few Yeast like cells per oil power field    Rare Gram variable coccobacilli     per oil power field  Preliminary Report (24 Apr 2019 21:29):    Normal Respiratory Ewelina present    Culture - Urine (collected 23 Apr 2019 13:01)  Source: .Urine  Final Report (24 Apr 2019 09:30):    No growth    Culture - Blood (collected 22 Apr 2019 12:23)  Source: .Blood  Preliminary Report (23 Apr 2019 13:01):    No growth to date.    Culture - Blood (collected 22 Apr 2019 12:23)  Source: .Blood  Preliminary Report (23 Apr 2019 13:01):    No growth to date.        Imaging Personally Reviewed:    Consultant(s) Notes Reviewed:   Care Discussed with Consultants/Other Providers:    MEDICATIONS  (STANDING):  ALBUTerol/ipratropium for Nebulization 3 milliLiter(s) Nebulizer every 6 hours  amiKACIN  IVPB 400 milliGRAM(s) IV Intermittent <User Schedule>  buDESOnide 160 MICROgram(s)/formoterol 4.5 MICROgram(s) Inhaler 2 Puff(s) Inhalation two times a day  chlorhexidine 4% Liquid 1 Application(s) Topical <User Schedule>  dextrose 5%. 1000 milliLiter(s) (50 mL/Hr) IV Continuous <Continuous>  dextrose 50% Injectable 12.5 Gram(s) IV Push once  dextrose 50% Injectable 25 Gram(s) IV Push once  dextrose 50% Injectable 25 Gram(s) IV Push once  docusate sodium 100 milliGRAM(s) Oral daily  ertapenem  IVPB 1000 milliGRAM(s) IV Intermittent every 24 hours  ertapenem  IVPB      ethambutol 800 milliGRAM(s) Oral daily  heparin  Injectable 5000 Unit(s) SubCutaneous every 8 hours  insulin lispro (HumaLOG) corrective regimen sliding scale   SubCutaneous three times a day before meals  insulin lispro (HumaLOG) corrective regimen sliding scale   SubCutaneous at bedtime  rifampin 600 milliGRAM(s) Oral daily  senna 2 Tablet(s) Oral at bedtime  simvastatin 20 milliGRAM(s) Oral at bedtime  sodium chloride 0.9% lock flush 3 milliLiter(s) IV Push every 8 hours    MEDICATIONS  (PRN):  ALBUTerol    90 MICROgram(s) HFA Inhaler 1 Puff(s) Inhalation every 4 hours PRN Shortness of Breath and/or Wheezing  dextrose 40% Gel 15 Gram(s) Oral once PRN Blood Glucose LESS THAN 70 milliGRAM(s)/deciliter  dextrose 40% Gel 15 Gram(s) Oral once PRN Blood Glucose LESS THAN 70 milliGRAM(s)/deciliter  glucagon  Injectable 1 milliGRAM(s) IntraMuscular once PRN Glucose LESS THAN 70 milligrams/deciliter  polyethylene glycol 3350 17 Gram(s) Oral daily PRN Constipation

## 2019-04-25 NOTE — PROGRESS NOTE ADULT - PROBLEM SELECTOR PLAN 1
-On presentation was 88% on RA in setting of MAC and severe emphysema / bullae and + adenovirus  -Currently satting at 95-96% on 2 L NC. Per RN, patient desaturates to 88% on RA. Mild hypoxemia may be 2/2 adenovirus vs. VILMA vs. atelectasis. Will attempt to wean off NC today  -c/w supportive care, incentive spirometry, OOB to chair   -Of note, patient was not on supplemental O2 at home but may meet criteria on this admission at discharge  -c/w symbicort BID, duoneb q6h, and albuterol PRN

## 2019-04-25 NOTE — PROVIDER CONTACT NOTE (OTHER) - REASON
patient's o2 sat on room air is 88% even when head of bed elevated and pt laying in bed. no signs of distress. pt placed back on 3L o2 nasal cannula and 94%

## 2019-04-25 NOTE — PROGRESS NOTE ADULT - PROBLEM SELECTOR PLAN 7
-DVT prophylaxis w HSQ  -Diet regular CC    Dispo: Need to obtain better glucose control. Attempting to wean off supplemental O2

## 2019-04-25 NOTE — PROGRESS NOTE ADULT - ASSESSMENT
75 yo male with a PMH of T2DM (on insulin, Victoza and metformin), VILMA (dx 2018, on IV abx via PICC placed 04/19 at [facility]), who presents to hospital after developing hypoglycemia at home.  On treatment for MAC, reportedly with Erta, Amikacin, Ethambutol, Rif  Presents with episode hypoglycemia, but found to have fevers on presentation  RVP positive for Adenovirus  Suspect fevers 2/2 viral URI  Overall, MAC, Adenovirus, upper respiratory infection  - Continue Ethambutol, Amikacin, Ertapenem, Rifampin  - Supportive care for viral URI  - On discharge, care of IV antibiotics to be managed by Pulmonologist who initiated regimen  - I counseled patient on possible adverse effects of MAC regimen, including visual disturbances/blindness and ototoxicty/renal toxicity; he communicates understanding  - Hypoglycemia per primary team    Darion Salazar MD  Pager [phone]  After 5pm and on weekends call [phone]

## 2019-04-25 NOTE — PROGRESS NOTE ADULT - PROBLEM SELECTOR PLAN 1
-Patient with Type 2 DM (A1C 8.2)- fair control. Presents with hypoglycemia.  -Patient reports was recently hospitalized last few weeks, eating less at home, and currently with infection on IV abx, spiking fevers.   -Regarding hypoglycemia, patient is actually not meeting  criteria for Whipple's triad as he is not symptomatic at the time of low glucose. May also be due to recurrent hypoglycemia at home and patient now with hypoglycemia unawareness.  -Currently off basal insulin, no recurrent hypoglycemia. Goal -200. AM BG is 178 on CMP. Would hold off on starting basal insuling for now.    -monitor FS with meals and bedtime  -AM cortisol, ACTH, DHEAS, TSH all WNL.  -Need to assess patient's need for insulin in future:  c-peptide, CHANELL, islet cell ab still pending -Patient with Type 2 DM (A1C 8.2)- fair control. Presents with hypoglycemia.  -Patient reports was recently hospitalized last few weeks, eating less at home, and currently with infection on IV abx, spiking fevers.   -Regarding hypoglycemia, patient is actually not meeting  criteria for Whipple's triad as he is not symptomatic at the time of low glucose. May also be due to recurrent hypoglycemia at home and patient now with hypoglycemia unawareness.  -Currently off basal insulin, no recurrent hypoglycemia. Goal -200. AM BG is 178 on CMP. Would hold off on starting basal insulin for now.  If FS persistently >200s during the day, can just change to a moderate correctional scale.  -monitor FS with meals and bedtime  -AM cortisol, ACTH, DHEAS, TSH all WNL.  -Need to assess patient's need for insulin in future:  c-peptide, CHAENLL, islet cell ab still pending -Patient with Type 2 DM (A1C 8.2)- fair control. Presents with hypoglycemia.  -Patient reports was recently hospitalized last few weeks, eating less at home, and currently with infection on IV abx, spiking fevers.   -Regarding hypoglycemia, patient is actually not meeting criteria for Whipple's triad as he is not symptomatic at the time of low glucose. May also be due to recurrent hypoglycemia at home and patient now with hypoglycemia unawareness.  -Currently off basal insulin, no recurrent hypoglycemia. Goal -200. AM BG is 178 on CMP. Would hold off on starting basal insulin for now.  If FS persistently >200s during the day, can just change to a moderate correctional scale.  -monitor FS with meals and bedtime  -AM cortisol, ACTH, DHEAS, TSH all WNL.  -Need to assess patient's need for insulin in future:  c-peptide, CHANELL, islet cell ab still pending  -D/c plan to be determined. Likely only Metformin and Januvia. Will set up apt with CDE and endocrinologist at [address] [place] [address] prior to discharge

## 2019-04-25 NOTE — PROGRESS NOTE ADULT - PROBLEM SELECTOR PLAN 2
-Dx 2018. On multiple abx in past. Current regiment: Amikacin, Ertapenem, Ethambutol, Levaquin, Rifampin. PICC placed during admission earlier this month at [facility].   -ID consulted  -Patient fever likely 2/2 adenovirus; will continue VILMA antibiotic regimen and supportive care

## 2019-04-25 NOTE — PHYSICAL THERAPY INITIAL EVALUATION ADULT - PRECAUTIONS/LIMITATIONS, REHAB EVAL
contact/droplet precautions; He reports he felt back to normal and declined to go to the hospital however a few hours later he became lethargic again and was brought to the ED by EMS because of repeated hypoglycemia. He reports he was recently in [facility] 4/9  - 4/17 for MAC and had a PICC placed which he has been using to give himself abx at home. CXR: Patchy opacities bilaterally superimposed upon pulmonary emphysema with right apical bullae./oxygen therapy device and L/min

## 2019-04-25 NOTE — PROGRESS NOTE ADULT - ASSESSMENT
73 yo male with a PMH of T2DM (A1C 8.2), VILMA (dx 2018, on IV abx via PICC placed 04/19 at [facility]), TB (age 15, txtd with PCN), who presents to hospital after developing hypoglycemia at home. Endocrine consulted for Type 2 DM with hypoglycemia.

## 2019-04-25 NOTE — PROGRESS NOTE ADULT - PROBLEM SELECTOR PLAN 4
-Diagnosed 1-2 years ago; A1c 8.2 % this admission  -Initially presented with hypoglycemia in setting of decreased PO intake at lunch time after taking insulin, Victoza and metformin in the AM.  -Current regimen: ISS  -Endocrine recs appreciated

## 2019-04-25 NOTE — PHYSICAL THERAPY INITIAL EVALUATION ADULT - ADDITIONAL COMMENTS
pt lives in apartment with spouse. no steps to negotiate. previously independent with all functional mobility.

## 2019-04-25 NOTE — PROGRESS NOTE ADULT - PROBLEM SELECTOR PLAN 3
-Presented for symptomatic hypoglycemic episode x2 s/p D10. Patient seen by MICU but FS improved and basal insulin was initially reduced  -course c/b episode of asymptomatic hypoglycemia on 4/24. D/cyndie all diabetic medications but pt now hyperglycemic. Restarted low sliding scale  -Endocrine recs appreciated  -Hypoglycemia likely 2/2 poor PO intake and insulin stacking but need to r/o other causes including adrenal insufficiency, hypothyroidism, insulinoma. F/u ACTH, DHEAS, AM cortisol, TSH, C-peptide, Glutamic acid decarboxylase antibody, islet antibody  -will monitor FSG before meals and at bedtime

## 2019-04-25 NOTE — PROGRESS NOTE ADULT - ASSESSMENT
The patient is a 73 yo male with a PMH of T2DM (on insulin 20 units AM (tresiba), victoza and metformin), VILMA (dx 2018, on IV abx via PICC placed 04/19 at [facility]), TB (age 15, txtd with PCN), who presents to hospital for symptomatic hypoglycemia likely 2/2 poor PO intake. At presentation, patient was found to be febrile to 102 and in hypoxic respiratory distress 2/2 + adenovirus URI

## 2019-04-25 NOTE — PROGRESS NOTE ADULT - SUBJECTIVE AND OBJECTIVE BOX
Chief Complaint: Type 2 DM with hypoglycemia    History: Patient reports eating is fair. No recurrent hypoglycemic episodes. No current complaints.    MEDICATIONS  (STANDING):  ALBUTerol/ipratropium for Nebulization 3 milliLiter(s) Nebulizer every 6 hours  amiKACIN  IVPB 400 milliGRAM(s) IV Intermittent <User Schedule>  buDESOnide 160 MICROgram(s)/formoterol 4.5 MICROgram(s) Inhaler 2 Puff(s) Inhalation two times a day  chlorhexidine 4% Liquid 1 Application(s) Topical <User Schedule>  dextrose 5%. 1000 milliLiter(s) (50 mL/Hr) IV Continuous <Continuous>  dextrose 50% Injectable 12.5 Gram(s) IV Push once  dextrose 50% Injectable 25 Gram(s) IV Push once  dextrose 50% Injectable 25 Gram(s) IV Push once  docusate sodium 100 milliGRAM(s) Oral daily  ertapenem  IVPB 1000 milliGRAM(s) IV Intermittent every 24 hours  ertapenem  IVPB      ethambutol 800 milliGRAM(s) Oral daily  heparin  Injectable 5000 Unit(s) SubCutaneous every 8 hours  insulin lispro (HumaLOG) corrective regimen sliding scale   SubCutaneous three times a day before meals  insulin lispro (HumaLOG) corrective regimen sliding scale   SubCutaneous at bedtime  rifampin 600 milliGRAM(s) Oral daily  senna 2 Tablet(s) Oral at bedtime  simvastatin 20 milliGRAM(s) Oral at bedtime  sodium chloride 0.9% lock flush 3 milliLiter(s) IV Push every 8 hours    MEDICATIONS  (PRN):  ALBUTerol    90 MICROgram(s) HFA Inhaler 1 Puff(s) Inhalation every 4 hours PRN Shortness of Breath and/or Wheezing  dextrose 40% Gel 15 Gram(s) Oral once PRN Blood Glucose LESS THAN 70 milliGRAM(s)/deciliter  dextrose 40% Gel 15 Gram(s) Oral once PRN Blood Glucose LESS THAN 70 milliGRAM(s)/deciliter  glucagon  Injectable 1 milliGRAM(s) IntraMuscular once PRN Glucose LESS THAN 70 milligrams/deciliter  polyethylene glycol 3350 17 Gram(s) Oral daily PRN Constipation      Allergies    No Known Allergies    Intolerances      Review of Systems:    ALL OTHER SYSTEMS REVIEWED AND NEGATIVE      PHYSICAL EXAM:  VITALS: T(C): 37.1 (04-25-19 @ 08:11)  T(F): 98.8 (04-25-19 @ 08:11), Max: 99.7 (04-25-19 @ 05:15)  HR: 76 (04-25-19 @ 12:21) (68 - 85)  BP: 96/50 (04-25-19 @ 08:11) (96/50 - 112/54)  RR:  (18 - 18)  SpO2:  (94% - 96%)  Wt(kg): --  GENERAL: NAD, well-groomed, well-developed, thin  EYES: No proptosis, no lid lag, anicteric  HEENT:  Atraumatic, Normocephalic, moist mucous membranes  SKIN: Dry, intact, No rashes or lesions  MUSCULOSKELETAL: Full range of motion, normal strength  NEURO: sensation intact, extraocular movements intact, no tremor  PSYCH: Alert and oriented x 3, normal affect, normal mood    POCT Blood Glucose.: 205 mg/dL (04-25-19 @ 12:28)  POCT Blood Glucose.: 163 mg/dL (04-25-19 @ 08:42)  POCT Blood Glucose.: 183 mg/dL (04-25-19 @ 03:15)  POCT Blood Glucose.: 311 mg/dL (04-24-19 @ 22:14)  POCT Blood Glucose.: 238 mg/dL (04-24-19 @ 17:30)  POCT Blood Glucose.: 187 mg/dL (04-24-19 @ 12:26)  POCT Blood Glucose.: 155 mg/dL (04-24-19 @ 08:52)  POCT Blood Glucose.: 43 mg/dL (04-24-19 @ 08:18)  POCT Blood Glucose.: 44 mg/dL (04-24-19 @ 08:17)  POCT Blood Glucose.: 165 mg/dL (04-23-19 @ 22:11)  POCT Blood Glucose.: 143 mg/dL (04-23-19 @ 17:28)  POCT Blood Glucose.: 203 mg/dL (04-23-19 @ 12:21)  POCT Blood Glucose.: 126 mg/dL (04-23-19 @ 08:50)  POCT Blood Glucose.: 140 mg/dL (04-23-19 @ 04:05)  POCT Blood Glucose.: 148 mg/dL (04-23-19 @ 00:24)  POCT Blood Glucose.: 147 mg/dL (04-22-19 @ 21:53)  POCT Blood Glucose.: 116 mg/dL (04-22-19 @ 18:01)  POCT Blood Glucose.: 177 mg/dL (04-22-19 @ 16:07)      04-25    132<L>  |  97  |  11  ----------------------------<  178<H>  4.3   |  23  |  0.77    EGFR if : 104  EGFR if non : 90    Ca    8.6      04-25  Mg     1.8     04-25  Phos  3.4     04-25            Thyroid Function Tests:  04-25 @ 09:22 TSH 1.39 FreeT4 -- T3 -- Anti TPO -- Anti Thyroglobulin Ab -- TSI --      Hemoglobin A1C, Whole Blood: 8.2 % <H> [4.0 - 5.6] (04-22-19 @ 22:30) Chief Complaint: Type 2 DM with hypoglycemia    History: Patient reports eating is fair. No recurrent hypoglycemic episodes. No current complaints.    MEDICATIONS  (STANDING):  ALBUTerol/ipratropium for Nebulization 3 milliLiter(s) Nebulizer every 6 hours  amiKACIN  IVPB 400 milliGRAM(s) IV Intermittent <User Schedule>  buDESOnide 160 MICROgram(s)/formoterol 4.5 MICROgram(s) Inhaler 2 Puff(s) Inhalation two times a day  chlorhexidine 4% Liquid 1 Application(s) Topical <User Schedule>  dextrose 5%. 1000 milliLiter(s) (50 mL/Hr) IV Continuous <Continuous>  dextrose 50% Injectable 12.5 Gram(s) IV Push once  dextrose 50% Injectable 25 Gram(s) IV Push once  dextrose 50% Injectable 25 Gram(s) IV Push once  docusate sodium 100 milliGRAM(s) Oral daily  ertapenem  IVPB 1000 milliGRAM(s) IV Intermittent every 24 hours  ertapenem  IVPB      ethambutol 800 milliGRAM(s) Oral daily  heparin  Injectable 5000 Unit(s) SubCutaneous every 8 hours  insulin lispro (HumaLOG) corrective regimen sliding scale   SubCutaneous three times a day before meals  insulin lispro (HumaLOG) corrective regimen sliding scale   SubCutaneous at bedtime  rifampin 600 milliGRAM(s) Oral daily  senna 2 Tablet(s) Oral at bedtime  simvastatin 20 milliGRAM(s) Oral at bedtime  sodium chloride 0.9% lock flush 3 milliLiter(s) IV Push every 8 hours    MEDICATIONS  (PRN):  ALBUTerol    90 MICROgram(s) HFA Inhaler 1 Puff(s) Inhalation every 4 hours PRN Shortness of Breath and/or Wheezing  dextrose 40% Gel 15 Gram(s) Oral once PRN Blood Glucose LESS THAN 70 milliGRAM(s)/deciliter  dextrose 40% Gel 15 Gram(s) Oral once PRN Blood Glucose LESS THAN 70 milliGRAM(s)/deciliter  glucagon  Injectable 1 milliGRAM(s) IntraMuscular once PRN Glucose LESS THAN 70 milligrams/deciliter  polyethylene glycol 3350 17 Gram(s) Oral daily PRN Constipation      Allergies    No Known Allergies    Intolerances      Review of Systems:    ALL OTHER SYSTEMS REVIEWED AND NEGATIVE      PHYSICAL EXAM:  VITALS: T(C): 37.1 (04-25-19 @ 08:11)  T(F): 98.8 (04-25-19 @ 08:11), Max: 99.7 (04-25-19 @ 05:15)  HR: 76 (04-25-19 @ 12:21) (68 - 85)  BP: 96/50 (04-25-19 @ 08:11) (96/50 - 112/54)  RR:  (18 - 18)  SpO2:  (94% - 96%)  GENERAL: NAD, well-groomed, well-developed, thin  EYES: No proptosis, no lid lag, anicteric  HEENT:  Atraumatic, Normocephalic, moist mucous membranes  SKIN: Dry, intact, No rashes or lesions  MUSCULOSKELETAL: Full range of motion, normal strength  NEURO: sensation intact, extraocular movements intact, no tremor  PSYCH: Alert and oriented x 3, normal affect, normal mood    POCT Blood Glucose.: 205 mg/dL (04-25-19 @ 12:28)  POCT Blood Glucose.: 163 mg/dL (04-25-19 @ 08:42)  POCT Blood Glucose.: 183 mg/dL (04-25-19 @ 03:15)  POCT Blood Glucose.: 311 mg/dL (04-24-19 @ 22:14)  POCT Blood Glucose.: 238 mg/dL (04-24-19 @ 17:30)  POCT Blood Glucose.: 187 mg/dL (04-24-19 @ 12:26)  POCT Blood Glucose.: 155 mg/dL (04-24-19 @ 08:52)  POCT Blood Glucose.: 43 mg/dL (04-24-19 @ 08:18)  POCT Blood Glucose.: 44 mg/dL (04-24-19 @ 08:17)  POCT Blood Glucose.: 165 mg/dL (04-23-19 @ 22:11)  POCT Blood Glucose.: 143 mg/dL (04-23-19 @ 17:28)  POCT Blood Glucose.: 203 mg/dL (04-23-19 @ 12:21)  POCT Blood Glucose.: 126 mg/dL (04-23-19 @ 08:50)  POCT Blood Glucose.: 140 mg/dL (04-23-19 @ 04:05)  POCT Blood Glucose.: 148 mg/dL (04-23-19 @ 00:24)  POCT Blood Glucose.: 147 mg/dL (04-22-19 @ 21:53)  POCT Blood Glucose.: 116 mg/dL (04-22-19 @ 18:01)  POCT Blood Glucose.: 177 mg/dL (04-22-19 @ 16:07)      04-25    132<L>  |  97  |  11  ----------------------------<  178<H>  4.3   |  23  |  0.77    EGFR if : 104  EGFR if non : 90    Ca    8.6      04-25  Mg     1.8     04-25  Phos  3.4     04-25            Thyroid Function Tests:  04-25 @ 09:22 TSH 1.39 FreeT4 -- T3 -- Anti TPO -- Anti Thyroglobulin Ab -- TSI --      Hemoglobin A1C, Whole Blood: 8.2 % <H> [4.0 - 5.6] (04-22-19 @ 22:30) Chief Complaint: Type 2 DM with hypoglycemia    History: Patient reports eating is fair. No recurrent hypoglycemic episodes. No current complaints. No diarrhea or nausea.    MEDICATIONS  (STANDING):  ALBUTerol/ipratropium for Nebulization 3 milliLiter(s) Nebulizer every 6 hours  amiKACIN  IVPB 400 milliGRAM(s) IV Intermittent <User Schedule>  buDESOnide 160 MICROgram(s)/formoterol 4.5 MICROgram(s) Inhaler 2 Puff(s) Inhalation two times a day  chlorhexidine 4% Liquid 1 Application(s) Topical <User Schedule>  dextrose 5%. 1000 milliLiter(s) (50 mL/Hr) IV Continuous <Continuous>  dextrose 50% Injectable 12.5 Gram(s) IV Push once  dextrose 50% Injectable 25 Gram(s) IV Push once  dextrose 50% Injectable 25 Gram(s) IV Push once  docusate sodium 100 milliGRAM(s) Oral daily  ertapenem  IVPB 1000 milliGRAM(s) IV Intermittent every 24 hours  ertapenem  IVPB      ethambutol 800 milliGRAM(s) Oral daily  heparin  Injectable 5000 Unit(s) SubCutaneous every 8 hours  insulin lispro (HumaLOG) corrective regimen sliding scale   SubCutaneous three times a day before meals  insulin lispro (HumaLOG) corrective regimen sliding scale   SubCutaneous at bedtime  rifampin 600 milliGRAM(s) Oral daily  senna 2 Tablet(s) Oral at bedtime  simvastatin 20 milliGRAM(s) Oral at bedtime  sodium chloride 0.9% lock flush 3 milliLiter(s) IV Push every 8 hours    MEDICATIONS  (PRN):  ALBUTerol    90 MICROgram(s) HFA Inhaler 1 Puff(s) Inhalation every 4 hours PRN Shortness of Breath and/or Wheezing  dextrose 40% Gel 15 Gram(s) Oral once PRN Blood Glucose LESS THAN 70 milliGRAM(s)/deciliter  dextrose 40% Gel 15 Gram(s) Oral once PRN Blood Glucose LESS THAN 70 milliGRAM(s)/deciliter  glucagon  Injectable 1 milliGRAM(s) IntraMuscular once PRN Glucose LESS THAN 70 milligrams/deciliter  polyethylene glycol 3350 17 Gram(s) Oral daily PRN Constipation      Allergies    No Known Allergies    Intolerances      Review of Systems:    ALL OTHER SYSTEMS REVIEWED AND NEGATIVE      PHYSICAL EXAM:  VITALS: T(C): 37.1 (04-25-19 @ 08:11)  T(F): 98.8 (04-25-19 @ 08:11), Max: 99.7 (04-25-19 @ 05:15)  HR: 76 (04-25-19 @ 12:21) (68 - 85)  BP: 96/50 (04-25-19 @ 08:11) (96/50 - 112/54)  RR:  (18 - 18)  SpO2:  (94% - 96%)  GENERAL: NAD, well-groomed, well-developed, thin  EYES: No proptosis, no lid lag, anicteric  HEENT:  Atraumatic, Normocephalic, moist mucous membranes  SKIN: Dry, intact, No rashes or lesions  MUSCULOSKELETAL: Full range of motion, normal strength  NEURO: sensation intact, extraocular movements intact, no tremor  PSYCH: Alert and oriented x 3, normal affect, normal mood    POCT Blood Glucose.: 205 mg/dL (04-25-19 @ 12:28)  POCT Blood Glucose.: 163 mg/dL (04-25-19 @ 08:42)  POCT Blood Glucose.: 183 mg/dL (04-25-19 @ 03:15)  POCT Blood Glucose.: 311 mg/dL (04-24-19 @ 22:14)  POCT Blood Glucose.: 238 mg/dL (04-24-19 @ 17:30)  POCT Blood Glucose.: 187 mg/dL (04-24-19 @ 12:26)  POCT Blood Glucose.: 155 mg/dL (04-24-19 @ 08:52)  POCT Blood Glucose.: 43 mg/dL (04-24-19 @ 08:18)  POCT Blood Glucose.: 44 mg/dL (04-24-19 @ 08:17)  POCT Blood Glucose.: 165 mg/dL (04-23-19 @ 22:11)  POCT Blood Glucose.: 143 mg/dL (04-23-19 @ 17:28)  POCT Blood Glucose.: 203 mg/dL (04-23-19 @ 12:21)  POCT Blood Glucose.: 126 mg/dL (04-23-19 @ 08:50)  POCT Blood Glucose.: 140 mg/dL (04-23-19 @ 04:05)  POCT Blood Glucose.: 148 mg/dL (04-23-19 @ 00:24)  POCT Blood Glucose.: 147 mg/dL (04-22-19 @ 21:53)  POCT Blood Glucose.: 116 mg/dL (04-22-19 @ 18:01)  POCT Blood Glucose.: 177 mg/dL (04-22-19 @ 16:07)      04-25    132<L>  |  97  |  11  ----------------------------<  178<H>  4.3   |  23  |  0.77    EGFR if : 104  EGFR if non : 90    Ca    8.6      04-25  Mg     1.8     04-25  Phos  3.4     04-25            Thyroid Function Tests:  04-25 @ 09:22 TSH 1.39 FreeT4 -- T3 -- Anti TPO -- Anti Thyroglobulin Ab -- TSI --      Hemoglobin A1C, Whole Blood: 8.2 % <H> [4.0 - 5.6] (04-22-19 @ 22:30)

## 2019-04-25 NOTE — PROGRESS NOTE ADULT - SUBJECTIVE AND OBJECTIVE BOX
CC: F/U for Viral URI    Saw/spoke to patient. No fevers, no chills. Unchanged, no new complaints.     Allergies  No Known Allergies    ANTIMICROBIALS:  amiKACIN  IVPB 400 <User Schedule>  ertapenem  IVPB 1000 every 24 hours  ertapenem  IVPB    ethambutol 800 daily  rifampin 600 daily    PE:    Vital Signs Last 24 Hrs  T(C): 37.1 (25 Apr 2019 08:11), Max: 37.6 (25 Apr 2019 05:15)  T(F): 98.8 (25 Apr 2019 08:11), Max: 99.7 (25 Apr 2019 05:15)  HR: 76 (25 Apr 2019 12:21) (68 - 85)  BP: 96/50 (25 Apr 2019 08:11) (96/50 - 112/54)  RR: 18 (25 Apr 2019 08:11) (18 - 18)  SpO2: 94% (25 Apr 2019 12:21) (94% - 96%)    Gen: AOx3, NAD, non-toxic, pleasant  CV: S1+S2 normal, nontachycardic  Resp: Clear bilat, no resp distress, no crackles/wheezes  Abd: Soft, nontender, +BS  Ext: No LE edema, no wounds    LABS:                        7.9    5.13  )-----------( 360      ( 25 Apr 2019 09:35 )             25.5     04-25    132<L>  |  97  |  11  ----------------------------<  178<H>  4.3   |  23  |  0.77    Ca    8.6      25 Apr 2019 07:16  Phos  3.4     04-25  Mg     1.8     04-25    MICROBIOLOGY:    .Sputum  04-24-19   Normal Respiratory Ewelina present     .Urine  04-23-19   No growth     .Blood  04-22-19   No growth to date.     Rapid RVP Result: Detected (04-22 @ 22:00) [place]    (otherwise reviewed)    RADIOLOGY:    4/22 CXR:    FINDINGS:     Left upper extremity PICC tip is in the distal SVC.    Emphysematous changes with right upper lung bullae. Patchy opacities   predominantly in the right mid to lower lung and the right lower lung. No   pleural effusions.    The cardiomediastinal silhouette is unremarkable.    The visualized osseous structures are unremarkable for age.    IMPRESSION:    Patchy opacities bilaterally superimposed upon pulmonary emphysema with   right apical bullae.

## 2019-04-26 ENCOUNTER — TRANSCRIPTION ENCOUNTER (OUTPATIENT)
Age: 74
End: 2019-04-26

## 2019-04-26 VITALS
HEART RATE: 89 BPM | RESPIRATION RATE: 18 BRPM | OXYGEN SATURATION: 92 % | TEMPERATURE: 98 F | DIASTOLIC BLOOD PRESSURE: 50 MMHG | SYSTOLIC BLOOD PRESSURE: 98 MMHG

## 2019-04-26 PROBLEM — A31.0 PULMONARY MYCOBACTERIAL INFECTION: Chronic | Status: ACTIVE | Noted: 2019-04-22

## 2019-04-26 PROBLEM — E11.8 TYPE 2 DIABETES MELLITUS WITH UNSPECIFIED COMPLICATIONS: Chronic | Status: ACTIVE | Noted: 2019-04-22

## 2019-04-26 LAB
ANION GAP SERPL CALC-SCNC: 11 MMOL/L — SIGNIFICANT CHANGE UP (ref 5–17)
BUN SERPL-MCNC: 11 MG/DL — SIGNIFICANT CHANGE UP (ref 7–23)
CALCIUM SERPL-MCNC: 8.9 MG/DL — SIGNIFICANT CHANGE UP (ref 8.4–10.5)
CHLORIDE SERPL-SCNC: 96 MMOL/L — SIGNIFICANT CHANGE UP (ref 96–108)
CO2 SERPL-SCNC: 24 MMOL/L — SIGNIFICANT CHANGE UP (ref 22–31)
CREAT SERPL-MCNC: 0.8 MG/DL — SIGNIFICANT CHANGE UP (ref 0.5–1.3)
GLUCOSE BLDC GLUCOMTR-MCNC: 136 MG/DL — HIGH (ref 70–99)
GLUCOSE BLDC GLUCOMTR-MCNC: 138 MG/DL — HIGH (ref 70–99)
GLUCOSE BLDC GLUCOMTR-MCNC: 167 MG/DL — HIGH (ref 70–99)
GLUCOSE BLDC GLUCOMTR-MCNC: 217 MG/DL — HIGH (ref 70–99)
GLUCOSE SERPL-MCNC: 140 MG/DL — HIGH (ref 70–99)
MAGNESIUM SERPL-MCNC: 1.8 MG/DL — SIGNIFICANT CHANGE UP (ref 1.6–2.6)
PHOSPHATE SERPL-MCNC: 3.3 MG/DL — SIGNIFICANT CHANGE UP (ref 2.5–4.5)
POTASSIUM SERPL-MCNC: 4.4 MMOL/L — SIGNIFICANT CHANGE UP (ref 3.5–5.3)
POTASSIUM SERPL-SCNC: 4.4 MMOL/L — SIGNIFICANT CHANGE UP (ref 3.5–5.3)
SODIUM SERPL-SCNC: 131 MMOL/L — LOW (ref 135–145)

## 2019-04-26 PROCEDURE — 85014 HEMATOCRIT: CPT

## 2019-04-26 PROCEDURE — 82330 ASSAY OF CALCIUM: CPT

## 2019-04-26 PROCEDURE — 82024 ASSAY OF ACTH: CPT

## 2019-04-26 PROCEDURE — 84443 ASSAY THYROID STIM HORMONE: CPT

## 2019-04-26 PROCEDURE — 80048 BASIC METABOLIC PNL TOTAL CA: CPT

## 2019-04-26 PROCEDURE — 93005 ELECTROCARDIOGRAM TRACING: CPT

## 2019-04-26 PROCEDURE — 81001 URINALYSIS AUTO W/SCOPE: CPT

## 2019-04-26 PROCEDURE — 82728 ASSAY OF FERRITIN: CPT

## 2019-04-26 PROCEDURE — 96375 TX/PRO/DX INJ NEW DRUG ADDON: CPT | Mod: 76

## 2019-04-26 PROCEDURE — 87581 M.PNEUMON DNA AMP PROBE: CPT

## 2019-04-26 PROCEDURE — 84681 ASSAY OF C-PEPTIDE: CPT

## 2019-04-26 PROCEDURE — 87086 URINE CULTURE/COLONY COUNT: CPT

## 2019-04-26 PROCEDURE — 83735 ASSAY OF MAGNESIUM: CPT

## 2019-04-26 PROCEDURE — 82803 BLOOD GASES ANY COMBINATION: CPT

## 2019-04-26 PROCEDURE — 87486 CHLMYD PNEUM DNA AMP PROBE: CPT

## 2019-04-26 PROCEDURE — 86900 BLOOD TYPING SEROLOGIC ABO: CPT

## 2019-04-26 PROCEDURE — 83540 ASSAY OF IRON: CPT

## 2019-04-26 PROCEDURE — 85027 COMPLETE CBC AUTOMATED: CPT

## 2019-04-26 PROCEDURE — 84132 ASSAY OF SERUM POTASSIUM: CPT

## 2019-04-26 PROCEDURE — 87798 DETECT AGENT NOS DNA AMP: CPT

## 2019-04-26 PROCEDURE — 87040 BLOOD CULTURE FOR BACTERIA: CPT

## 2019-04-26 PROCEDURE — 84295 ASSAY OF SERUM SODIUM: CPT

## 2019-04-26 PROCEDURE — 82962 GLUCOSE BLOOD TEST: CPT

## 2019-04-26 PROCEDURE — 99239 HOSP IP/OBS DSCHRG MGMT >30: CPT

## 2019-04-26 PROCEDURE — 96376 TX/PRO/DX INJ SAME DRUG ADON: CPT

## 2019-04-26 PROCEDURE — 99232 SBSQ HOSP IP/OBS MODERATE 35: CPT | Mod: GC

## 2019-04-26 PROCEDURE — 71045 X-RAY EXAM CHEST 1 VIEW: CPT

## 2019-04-26 PROCEDURE — 86341 ISLET CELL ANTIBODY: CPT

## 2019-04-26 PROCEDURE — 82627 DEHYDROEPIANDROSTERONE: CPT

## 2019-04-26 PROCEDURE — 86803 HEPATITIS C AB TEST: CPT

## 2019-04-26 PROCEDURE — 87633 RESP VIRUS 12-25 TARGETS: CPT

## 2019-04-26 PROCEDURE — G0378: CPT

## 2019-04-26 PROCEDURE — 82533 TOTAL CORTISOL: CPT

## 2019-04-26 PROCEDURE — 83605 ASSAY OF LACTIC ACID: CPT

## 2019-04-26 PROCEDURE — 80053 COMPREHEN METABOLIC PANEL: CPT

## 2019-04-26 PROCEDURE — 99232 SBSQ HOSP IP/OBS MODERATE 35: CPT

## 2019-04-26 PROCEDURE — 86850 RBC ANTIBODY SCREEN: CPT

## 2019-04-26 PROCEDURE — 99285 EMERGENCY DEPT VISIT HI MDM: CPT | Mod: 25

## 2019-04-26 PROCEDURE — 86901 BLOOD TYPING SEROLOGIC RH(D): CPT

## 2019-04-26 PROCEDURE — 82435 ASSAY OF BLOOD CHLORIDE: CPT

## 2019-04-26 PROCEDURE — 87070 CULTURE OTHR SPECIMN AEROBIC: CPT

## 2019-04-26 PROCEDURE — 82947 ASSAY GLUCOSE BLOOD QUANT: CPT

## 2019-04-26 PROCEDURE — 96374 THER/PROPH/DIAG INJ IV PUSH: CPT

## 2019-04-26 PROCEDURE — 97161 PT EVAL LOW COMPLEX 20 MIN: CPT

## 2019-04-26 PROCEDURE — 83036 HEMOGLOBIN GLYCOSYLATED A1C: CPT

## 2019-04-26 PROCEDURE — 84100 ASSAY OF PHOSPHORUS: CPT

## 2019-04-26 PROCEDURE — 94640 AIRWAY INHALATION TREATMENT: CPT

## 2019-04-26 RX ORDER — METFORMIN HYDROCHLORIDE 850 MG/1
2 TABLET ORAL
Qty: 120 | Refills: 0 | OUTPATIENT
Start: 2019-04-26 | End: 2019-05-25

## 2019-04-26 RX ORDER — INSULIN DEGLUDEC 100 U/ML
20 INJECTION, SOLUTION SUBCUTANEOUS
Qty: 0 | Refills: 0 | COMMUNITY

## 2019-04-26 RX ORDER — SITAGLIPTIN 50 MG/1
1 TABLET, FILM COATED ORAL
Qty: 30 | Refills: 2 | OUTPATIENT
Start: 2019-04-26 | End: 2019-07-24

## 2019-04-26 RX ORDER — LIRAGLUTIDE 6 MG/ML
1.8 INJECTION SUBCUTANEOUS
Qty: 0 | Refills: 0 | COMMUNITY

## 2019-04-26 RX ORDER — METFORMIN HYDROCHLORIDE 850 MG/1
2 TABLET ORAL
Qty: 0 | Refills: 0 | COMMUNITY

## 2019-04-26 RX ADMIN — SODIUM CHLORIDE 3 MILLILITER(S): 9 INJECTION INTRAMUSCULAR; INTRAVENOUS; SUBCUTANEOUS at 13:01

## 2019-04-26 RX ADMIN — Medication 3 MILLILITER(S): at 05:34

## 2019-04-26 RX ADMIN — Medication 3 MILLILITER(S): at 17:50

## 2019-04-26 RX ADMIN — BUDESONIDE AND FORMOTEROL FUMARATE DIHYDRATE 2 PUFF(S): 160; 4.5 AEROSOL RESPIRATORY (INHALATION) at 17:50

## 2019-04-26 RX ADMIN — AMIKACIN SULFATE 101.6 MILLIGRAM(S): 250 INJECTION, SOLUTION INTRAMUSCULAR; INTRAVENOUS at 08:01

## 2019-04-26 RX ADMIN — Medication 3 MILLILITER(S): at 00:35

## 2019-04-26 RX ADMIN — Medication 100 MILLIGRAM(S): at 11:07

## 2019-04-26 RX ADMIN — SODIUM CHLORIDE 3 MILLILITER(S): 9 INJECTION INTRAMUSCULAR; INTRAVENOUS; SUBCUTANEOUS at 05:29

## 2019-04-26 RX ADMIN — BUDESONIDE AND FORMOTEROL FUMARATE DIHYDRATE 2 PUFF(S): 160; 4.5 AEROSOL RESPIRATORY (INHALATION) at 05:33

## 2019-04-26 RX ADMIN — Medication 2: at 12:57

## 2019-04-26 RX ADMIN — HEPARIN SODIUM 5000 UNIT(S): 5000 INJECTION INTRAVENOUS; SUBCUTANEOUS at 13:01

## 2019-04-26 RX ADMIN — Medication 1: at 08:39

## 2019-04-26 RX ADMIN — Medication 3 MILLILITER(S): at 11:07

## 2019-04-26 RX ADMIN — HEPARIN SODIUM 5000 UNIT(S): 5000 INJECTION INTRAVENOUS; SUBCUTANEOUS at 05:34

## 2019-04-26 RX ADMIN — ETHAMBUTOL HYDROCHLORIDE 800 MILLIGRAM(S): 400 TABLET, FILM COATED ORAL at 11:07

## 2019-04-26 RX ADMIN — CHLORHEXIDINE GLUCONATE 1 APPLICATION(S): 213 SOLUTION TOPICAL at 11:07

## 2019-04-26 RX ADMIN — ERTAPENEM SODIUM 120 MILLIGRAM(S): 1 INJECTION, POWDER, LYOPHILIZED, FOR SOLUTION INTRAMUSCULAR; INTRAVENOUS at 13:00

## 2019-04-26 NOTE — PROGRESS NOTE ADULT - SUBJECTIVE AND OBJECTIVE BOX
[place] [facility] [place]/spoke to patient. No fevers, no chills. No new complaints. Unchanged.     Allergies  No Known Allergies    ANTIMICROBIALS:  amiKACIN  IVPB 400 <User Schedule>  ertapenem  IVPB 1000 every 24 hours  ertapenem  IVPB    ethambutol 800 daily  rifampin 600 daily    PE:    Vital Signs Last 24 Hrs  T(C): 36.6 (26 Apr 2019 11:03), Max: 37.7 (25 Apr 2019 20:09)  T(F): 97.8 (26 Apr 2019 11:03), Max: 99.8 (25 Apr 2019 20:09)  HR: 89 (26 Apr 2019 11:03) (74 - 94)  BP: 98/50 (26 Apr 2019 11:03) (98/50 - 109/55)  RR: 18 (26 Apr 2019 11:03) (18 - 18)  SpO2: 92% (26 Apr 2019 11:03) (85% - 94%)    Gen: AOx3, NAD, non-toxic, pleasant  CV: S1+S2 normal, nontachycardic  Resp: Clear bilat, no resp distress, no crackles/wheezes  Abd: Soft, nontender, +BS  Ext: No LE edema, no wounds    LABS:                        7.9    5.13  )-----------( 360      ( 25 Apr 2019 09:35 )             25.5     04-26    131<L>  |  96  |  11  ----------------------------<  140<H>  4.4   |  24  |  0.80    Ca    8.9      26 Apr 2019 05:43  Phos  3.3     04-26  Mg     1.8     04-26    MICROBIOLOGY:    .Blood  04-24-19   No growth to date.     .Sputum  04-24-19   Normal Respiratory Ewelina present    .Urine  04-23-19   No growth    .Blood  04-22-19   No growth to date.  --  --    Rapid RVP Result: Detected (04-22 @ 22:00)    (otherwise reviewed)    RADIOLOGY:    4/22 CXR:    IMPRESSION:    Patchy opacities bilaterally superimposed upon pulmonary emphysema with   right apical bullae.

## 2019-04-26 NOTE — PROGRESS NOTE ADULT - ASSESSMENT
73 yo male with a PMH of T2DM (on insulin, Victoza and metformin), VILMA (dx 2018, on IV abx via PICC placed 04/19 at [facility]), who presents to hospital after developing hypoglycemia at home.  On treatment for MAC, reportedly with Erta, Amikacin, Ethambutol, Rif  Presents with episode hypoglycemia, but found to have fevers on presentation  RVP positive for Adenovirus  Suspect fevers 2/2 viral URI  Overall, MAC, Adenovirus, upper respiratory infection  - Continue Ethambutol, Amikacin, Ertapenem, Rifampin  - Supportive care for viral URI  - On discharge, care of IV antibiotics to be managed by Pulmonologist who initiated regimen (including labs for monitoring toxicity)  - Prior, I counseled patient on possible adverse effects of MAC regimen, including visual disturbances/blindness and ototoxicty/renal toxicity; he communicates understanding  - If patient remains hospitalized, would check Amikacin level Dewey am to evaluate trough (note this level is anticiapte/desired to be low/undetected)    Darino Salazar MD  Pager [phone]  After 5pm and on weekends call [phone]

## 2019-04-26 NOTE — PROGRESS NOTE ADULT - PROBLEM SELECTOR PROBLEM 1
Acute hypoxemic respiratory failure
Type 2 diabetes mellitus with hypoglycemia without coma, with long-term current use of insulin
Type 2 diabetes mellitus with hypoglycemia without coma, with long-term current use of insulin
Acute hypoxemic respiratory failure

## 2019-04-26 NOTE — PROGRESS NOTE ADULT - PROBLEM SELECTOR PLAN 7
-DVT prophylaxis w HSQ  -Diet regular CC    Dispo: Determine outpatient diabetic medications, may need supplemental O2 -DVT prophylaxis w HSQ  -Diet regular CC    Dispo: Pending diabetes medication to be discharged with. Will need home supplemental O2 as desaturated to 84% on RA today. Plan for home today

## 2019-04-26 NOTE — PROGRESS NOTE ADULT - ATTENDING COMMENTS
73 yo male with a PMH of T2DM (on insulin, Victoza and metformin), VILMA (dx 2018, on IV abx via PICC placed 04/19 at [facility]), who presents to hospital after developing hypoglycemia at home.  On treatment for MAC, reportedly with Erta, Amikacin, Ethambutol, Rif  Presents with episode hypoglycemia, but found to have fevers on presentation  RVP positive for Adenovirus  Suspect fevers 2/2 viral URI  Overall, MAC, Adenovirus, upper respiratory infection  - Continue Ethambutol, Amikacin, Ertapenem, Rifampin  - Supportive care for viral URI  - On discharge, care of IV antibiotics to be managed by Pulmonologist who initiated regimen  - I counseled patient on possible adverse effects of MAC regimen, including visual disturbances/blindness and ototoxicty/renal toxicity; he communicates understanding  - Evaluated antibiotics--these medications do not appear to cause hypoglycemia. Also do not appear to have interactions with Tresiba, Victoza, Metformin to further contribute to hypoglycemia.  - Discussed with medicine team    Darion Salazar MD  Pager [phone]  After 5pm and on weekends call [phone]    I was physically present for the key portions of the evaluation and management service provided. I saw and examined the patient. I agree with the above history, physical, and plan except for any discrepancies which I have documented in “Attending Attestation.” Please refer to “Attending Attestation” for final plan.
Hypoxia and fevers resolved. Patient with recurrent hypoglycemia, adjusting insulin regimen. Patient states he does not follow a consistent carbohydrate diet outside the hospital. Pending further endocrine recommendations plan for d/c home on previous IV abx regimen for MAC treatment.
Patient with recurrent hypoglycemia during hospitalization, holding insulin therapy but resuming Metformin and adding Januvia.  D/c home on previously established IV abx regimen for MAC treatment and supplemental oxygen and patient to follow up with his pulmonologist at [facility] Dr. Conklin. 50 minutes spent discharging the patient.

## 2019-04-26 NOTE — PROGRESS NOTE ADULT - PROBLEM SELECTOR PLAN 1
-On presentation was 88% on RA in setting of MAC and severe emphysema / bullae and + adenovirus  -Overnight, desaturated to 88% on RA while laying in bed. Placed on 3 L NC and improved to 94%.   -Mild hypoxemia may be 2/2 adenovirus vs. VILMA vs. atelectasis.   -c/w supportive care, incentive spirometry, OOB to chair   -Of note, patient was not on supplemental O2 at home but may meet criteria on this admission at discharge  -c/w symbicort BID, duoneb q6h, and albuterol PRN

## 2019-04-26 NOTE — PROGRESS NOTE ADULT - ASSESSMENT
75 yo male with a PMH of T2DM (A1C 8.2), VILMA (dx 2018, on IV abx via PICC placed 04/19 at [facility]), TB (age 15, txtd with PCN), who presents to hospital after developing hypoglycemia at home. Endocrine consulted for Type 2 DM with hypoglycemia.

## 2019-04-26 NOTE — PROGRESS NOTE ADULT - PROBLEM SELECTOR PLAN 2
-Dx 2018. On multiple abx in past. Current regiment: Amikacin, Ertapenem, Ethambutol, Levaquin, Rifampin. PICC placed during admission earlier this month at [facility].   -ID consulted  -Patient fever likely 2/2 adenovirus; will continue VILMA antibiotic regimen and supportive care

## 2019-04-26 NOTE — PROGRESS NOTE ADULT - PROBLEM SELECTOR PROBLEM 2
Mycobacterium avium-intracellulare complex

## 2019-04-26 NOTE — PROGRESS NOTE ADULT - PROBLEM SELECTOR PLAN 1
-Patient with Type 2 DM (A1C 8.2)- fair control. Presents with hypoglycemia.  -Patient reports was recently hospitalized last few weeks, eating less at home, and currently with infection on IV abx  -Regarding hypoglycemia, patient is actually not meeting criteria for Whipple's triad as he is not symptomatic at the time of low glucose. May also be due to recurrent hypoglycemia at home and patient now with hypoglycemia unawareness.  -Currently off basal insulin, no recurrent hypoglycemia. Goal -200. AM BG is 178 on CMP. Would hold off on starting basal insulin for now.  If FS persistently >200s during the day, can just change to a moderate correctional scale.  -monitor FS with meals and bedtime  -AM cortisol, ACTH, DHEAS, TSH all WNL.  -Need to assess patient's need for insulin in future:  c-peptide WNL 2.4.  CHANELL, islet cell ab still pending  -D/c plan: increase to metformin 500mg BID with food for 1 week, then uptitrate following week if tolerating well to 1000mg BID. Discontinue vicotza and tresiba. Start januvia 100mg daily. Please make sure medication covered by pts insurance prior to dc.  -Will set up apt with CDE and endocrinologist at [address] [place] [address] prior to discharge.  D/W primary team resident -Patient with Type 2 DM (A1C 8.2)- fair control. Presents with hypoglycemia.  -Patient reports was recently hospitalized last few weeks, eating less at home, and currently with infection on IV abx  -Regarding hypoglycemia, patient is actually not meeting criteria for Whipple's triad as he is not symptomatic at the time of low glucose. May also be due to recurrent hypoglycemia at home and patient now with hypoglycemia unawareness.  -Currently off basal insulin, no recurrent hypoglycemia. Goal -200. AM BG is 178 on CMP. Would hold off on starting basal insulin for now.  If FS persistently >200s during the day, can just change to a moderate correctional scale.  -monitor FS with meals and bedtime  -AM cortisol, ACTH, DHEAS, TSH all WNL.  -Need to assess patient's need for insulin in future:  c-peptide WNL 2.4.  CHANELL, islet cell ab still pending  -D/c plan: increase to metformin 500mg BID with food for 1 week, then uptitrate following week if tolerating well to 1000mg BID. Discontinue vicotza and tresiba. Start januvia 100mg daily. Please make sure medication covered by pts insurance prior to dc.  -Has appt with GRADY oshea on 6/3/19 at 2pm at [address] and appt with Dr[address] Jacques on 8/16/19 at 3pm at [address] [phone]  D/W primary team resident

## 2019-04-26 NOTE — CHART NOTE - NSCHARTNOTEFT_GEN_A_CORE
Notified by nursing staff that patient desaturated to 85% on room air while resting. Placed on 2L NC and patient saturating at 94%. Patient qualified for home oxygen on discharge.     He Anjana - PGY2  Spectra [phone]

## 2019-04-26 NOTE — PROGRESS NOTE ADULT - PROVIDER SPECIALTY LIST ADULT
Endocrinology
Endocrinology
Infectious Disease
Internal Medicine

## 2019-04-26 NOTE — PROGRESS NOTE ADULT - PROBLEM SELECTOR PLAN 5
-RVP+ for adenovirus. Febrile to 102.  -supportive care

## 2019-04-26 NOTE — DISCHARGE NOTE NURSING/CASE MANAGEMENT/SOCIAL WORK - NSDCFUADDAPPT_GEN_ALL_CORE_FT
Will follow-up on Monday w/ [facility] for home RN visits for diabetic education & home oxygen review.

## 2019-04-26 NOTE — PROGRESS NOTE ADULT - SUBJECTIVE AND OBJECTIVE BOX
Chief Complaint: Type 2 DM with hypoglycemia    History: Patient reports eating well. Will be dc today as per primary team. No recurrent hypoglycemia.    MEDICATIONS  (STANDING):  ALBUTerol/ipratropium for Nebulization 3 milliLiter(s) Nebulizer every 6 hours  amiKACIN  IVPB 400 milliGRAM(s) IV Intermittent <User Schedule>  buDESOnide 160 MICROgram(s)/formoterol 4.5 MICROgram(s) Inhaler 2 Puff(s) Inhalation two times a day  chlorhexidine 4% Liquid 1 Application(s) Topical <User Schedule>  dextrose 5%. 1000 milliLiter(s) (50 mL/Hr) IV Continuous <Continuous>  dextrose 50% Injectable 12.5 Gram(s) IV Push once  dextrose 50% Injectable 25 Gram(s) IV Push once  dextrose 50% Injectable 25 Gram(s) IV Push once  docusate sodium 100 milliGRAM(s) Oral daily  ertapenem  IVPB 1000 milliGRAM(s) IV Intermittent every 24 hours  ertapenem  IVPB      ethambutol 800 milliGRAM(s) Oral daily  heparin  Injectable 5000 Unit(s) SubCutaneous every 8 hours  insulin lispro (HumaLOG) corrective regimen sliding scale   SubCutaneous three times a day before meals  insulin lispro (HumaLOG) corrective regimen sliding scale   SubCutaneous at bedtime  rifampin 600 milliGRAM(s) Oral daily  senna 2 Tablet(s) Oral at bedtime  simvastatin 20 milliGRAM(s) Oral at bedtime  sodium chloride 0.9% lock flush 3 milliLiter(s) IV Push every 8 hours    MEDICATIONS  (PRN):  ALBUTerol    90 MICROgram(s) HFA Inhaler 1 Puff(s) Inhalation every 4 hours PRN Shortness of Breath and/or Wheezing  dextrose 40% Gel 15 Gram(s) Oral once PRN Blood Glucose LESS THAN 70 milliGRAM(s)/deciliter  dextrose 40% Gel 15 Gram(s) Oral once PRN Blood Glucose LESS THAN 70 milliGRAM(s)/deciliter  glucagon  Injectable 1 milliGRAM(s) IntraMuscular once PRN Glucose LESS THAN 70 milligrams/deciliter  polyethylene glycol 3350 17 Gram(s) Oral daily PRN Constipation      Allergies    No Known Allergies    Intolerances      Review of Systems:    ALL OTHER SYSTEMS REVIEWED AND NEGATIVE    PHYSICAL EXAM:  VITALS: T(C): 36.6 (04-26-19 @ 11:03)  T(F): 97.8 (04-26-19 @ 11:03), Max: 99.8 (04-25-19 @ 20:09)  HR: 89 (04-26-19 @ 11:03) (74 - 94)  BP: 98/50 (04-26-19 @ 11:03) (98/50 - 109/55)  RR:  (18 - 18)  SpO2:  (85% - 94%)  GENERAL: NAD, well-groomed, well-developed, thin  EYES: No proptosis, no lid lag, anicteric  HEENT:  Atraumatic, Normocephalic, moist mucous membranes  SKIN: Dry, intact, No rashes or lesions  MUSCULOSKELETAL: Full range of motion, normal strength  NEURO: sensation intact, extraocular movements intact, no tremor  PSYCH: Alert and oriented x 3, normal affect, normal mood      POCT Blood Glucose.: 217 mg/dL (04-26-19 @ 12:43)  POCT Blood Glucose.: 167 mg/dL (04-26-19 @ 08:07)  POCT Blood Glucose.: 138 mg/dL (04-26-19 @ 02:58)  POCT Blood Glucose.: 184 mg/dL (04-25-19 @ 22:09)  POCT Blood Glucose.: 185 mg/dL (04-25-19 @ 17:26)  POCT Blood Glucose.: 205 mg/dL (04-25-19 @ 12:28)  POCT Blood Glucose.: 163 mg/dL (04-25-19 @ 08:42)  POCT Blood Glucose.: 183 mg/dL (04-25-19 @ 03:15)  POCT Blood Glucose.: 311 mg/dL (04-24-19 @ 22:14)  POCT Blood Glucose.: 238 mg/dL (04-24-19 @ 17:30)  POCT Blood Glucose.: 187 mg/dL (04-24-19 @ 12:26)  POCT Blood Glucose.: 155 mg/dL (04-24-19 @ 08:52)  POCT Blood Glucose.: 43 mg/dL (04-24-19 @ 08:18)  POCT Blood Glucose.: 44 mg/dL (04-24-19 @ 08:17)  POCT Blood Glucose.: 165 mg/dL (04-23-19 @ 22:11)  POCT Blood Glucose.: 143 mg/dL (04-23-19 @ 17:28)      04-26    131<L>  |  96  |  11  ----------------------------<  140<H>  4.4   |  24  |  0.80    EGFR if : 103  EGFR if non : 89    Ca    8.9      04-26  Mg     1.8     04-26  Phos  3.3     04-26            Thyroid Function Tests:  04-25 @ 09:22 TSH 1.39 FreeT4 -- T3 -- Anti TPO -- Anti Thyroglobulin Ab -- TSI --      Hemoglobin A1C, Whole Blood: 8.2 % <H> [4.0 - 5.6] (04-22-19 @ 22:30)

## 2019-04-26 NOTE — PROGRESS NOTE ADULT - REASON FOR ADMISSION
hypoxia and fever
hypoxia and fever,

## 2019-04-26 NOTE — DISCHARGE NOTE NURSING/CASE MANAGEMENT/SOCIAL WORK - NSDCDPATPORTLINK_GEN_ALL_CORE
You can access the [facility], offered by [facility], by registering with the following website: [email or web address]

## 2019-04-26 NOTE — PROGRESS NOTE ADULT - PROBLEM SELECTOR PLAN 3
-Presented for symptomatic hypoglycemic episode x2 s/p D10. Patient seen by MICU but FS improved and basal insulin was initially reduced  -course c/b episode of asymptomatic hypoglycemia on 4/24. D/cyndie all diabetic medications.  -Patient FS ranged from 138-180.  this AM w/o any basal insulin.  -Endocrine recs appreciated  -Hypoglycemia likely 2/2 poor PO intake and insulin stacking but need to r/o other causes including adrenal insufficiency, hypothyroidism, insulinoma. ACTH, DHEAS, AM cortisol, TSH, C-peptide, Glutamic acid decarboxylase antibody, islet antibody WNL  -will monitor FSG before meals and at bedtime

## 2019-04-26 NOTE — PROGRESS NOTE ADULT - PROBLEM SELECTOR PLAN 6
-Patient has full medical decision making capacity regarding his medical illnesses. He had insight into his disease processes. He has a guarded prognosis. In the event of clinical deterioration the patient reports he would want a trial of chest compression and/or intubation to be performed. He would not want to be in a prolonged vegetative state. He has not officially appointed health care proxy but has spouse would likely be. He has two kids as well. MOLST form completed to reflect patient's wishes. Total bedside time spent 30 minutes

## 2019-04-26 NOTE — PROGRESS NOTE ADULT - SUBJECTIVE AND OBJECTIVE BOX
Patient is a 74y old  Male who presents with a chief complaint of hypoxia and fever (25 Apr 2019 15:34)    Cecy Chamberlain  Internal Medicine PGY-1  Pager # [phone]/ [phone]    SUBJECTIVE / OVERNIGHT EVENTS: Patient seen and examined at bedside. Overnight, patient desaturated to 88% and was placed on 3 L NC, improved to 94%. This morning, denies any symptoms of SOB, cough, CP, or dizziness.     OBJECTIVE:  Vital Signs Last 24 Hrs  T(C): 37.5 (26 Apr 2019 05:32), Max: 37.7 (25 Apr 2019 20:09)  T(F): 99.5 (26 Apr 2019 05:32), Max: 99.8 (25 Apr 2019 20:09)  HR: 74 (26 Apr 2019 05:32) (74 - 94)  BP: 104/58 (26 Apr 2019 05:32) (96/50 - 109/55)  BP(mean): --  RR: 18 (26 Apr 2019 05:32) (18 - 18)  SpO2: 94% (26 Apr 2019 05:32) (88% - 94%)    I&O's Summary    25 Apr 2019 07:01  -  26 Apr 2019 07:00  --------------------------------------------------------  IN: 960 mL / OUT: 700 mL / NET: 260 mL      Physical Examination:  GEN: thin man, NAD  PSYCH: A&Ox3, mood and affect appear appropriate   SKIN: intact, no e/o rash  NEURO: no focal neurologic deficits appreciated; CN II-XII intact, sensation intact B/L, motor 5/5 in all mm groups  EYES: PERRL, anicteric, EOMI, no vertical/horizontal nystagmus  HEAD: NC, AT  NECK: supple  RESPI: no accessory muscle use, inspiratory crackles noted b/l-more prominent in right lower lobes, no wheezing noted  CARDIO: S1 and S2, regular rate/rhythm, no LE edema B/L  ABD: soft, NT, ND, +BS  EXT: patient able to move all extremities spontaneously  VASC: peripheral pulses palpated, no LE edema       Labs:  CAPILLARY BLOOD GLUCOSE      POCT Blood Glucose.: 138 mg/dL (26 Apr 2019 02:58)  POCT Blood Glucose.: 184 mg/dL (25 Apr 2019 22:09)  POCT Blood Glucose.: 185 mg/dL (25 Apr 2019 17:26)  POCT Blood Glucose.: 205 mg/dL (25 Apr 2019 12:28)  POCT Blood Glucose.: 163 mg/dL (25 Apr 2019 08:42)                          7.9    5.13  )-----------( 360      ( 25 Apr 2019 09:35 )             25.5     04-26    131<L>  |  96  |  11  ----------------------------<  140<H>  4.4   |  24  |  0.80    Ca    8.9      26 Apr 2019 05:43  Phos  3.3     04-26  Mg     1.8     04-26              Culture - Blood (collected 24 Apr 2019 17:34)  Source: .Blood  Preliminary Report (25 Apr 2019 18:01):    No growth to date.    Culture - Blood (collected 24 Apr 2019 17:34)  Source: .Blood  Preliminary Report (25 Apr 2019 18:01):    No growth to date.    Culture - Sputum (collected 24 Apr 2019 01:16)  Source: .Sputum  Gram Stain (24 Apr 2019 06:04):    Few Squamous epithelial cells per low power field    Few polymorphonuclear leukocytes per low power field    Few Yeast like cells per oil power field    Rare Gram variable coccobacilli     per oil power field  Final Report (25 Apr 2019 17:47):    Normal Respiratory Ewelina present    Culture - Urine (collected 23 Apr 2019 13:01)  Source: .Urine  Final Report (24 Apr 2019 09:30):    No growth        Imaging Personally Reviewed:    Consultant(s) Notes Reviewed:   Care Discussed with Consultants/Other Providers:    MEDICATIONS  (STANDING):  ALBUTerol/ipratropium for Nebulization 3 milliLiter(s) Nebulizer every 6 hours  amiKACIN  IVPB 400 milliGRAM(s) IV Intermittent <User Schedule>  buDESOnide 160 MICROgram(s)/formoterol 4.5 MICROgram(s) Inhaler 2 Puff(s) Inhalation two times a day  chlorhexidine 4% Liquid 1 Application(s) Topical <User Schedule>  dextrose 5%. 1000 milliLiter(s) (50 mL/Hr) IV Continuous <Continuous>  dextrose 50% Injectable 12.5 Gram(s) IV Push once  dextrose 50% Injectable 25 Gram(s) IV Push once  dextrose 50% Injectable 25 Gram(s) IV Push once  docusate sodium 100 milliGRAM(s) Oral daily  ertapenem  IVPB 1000 milliGRAM(s) IV Intermittent every 24 hours  ertapenem  IVPB      ethambutol 800 milliGRAM(s) Oral daily  heparin  Injectable 5000 Unit(s) SubCutaneous every 8 hours  insulin lispro (HumaLOG) corrective regimen sliding scale   SubCutaneous three times a day before meals  insulin lispro (HumaLOG) corrective regimen sliding scale   SubCutaneous at bedtime  rifampin 600 milliGRAM(s) Oral daily  senna 2 Tablet(s) Oral at bedtime  simvastatin 20 milliGRAM(s) Oral at bedtime  sodium chloride 0.9% lock flush 3 milliLiter(s) IV Push every 8 hours    MEDICATIONS  (PRN):  ALBUTerol    90 MICROgram(s) HFA Inhaler 1 Puff(s) Inhalation every 4 hours PRN Shortness of Breath and/or Wheezing  dextrose 40% Gel 15 Gram(s) Oral once PRN Blood Glucose LESS THAN 70 milliGRAM(s)/deciliter  dextrose 40% Gel 15 Gram(s) Oral once PRN Blood Glucose LESS THAN 70 milliGRAM(s)/deciliter  glucagon  Injectable 1 milliGRAM(s) IntraMuscular once PRN Glucose LESS THAN 70 milligrams/deciliter  polyethylene glycol 3350 17 Gram(s) Oral daily PRN Constipation

## 2019-04-26 NOTE — PROGRESS NOTE ADULT - ASSESSMENT
The patient is a 75 yo male with a PMH of T2DM (on insulin 20 units AM (tresiba), victoza and metformin), VILMA (dx 2018, on IV abx via PICC placed 04/19 at [facility]), TB (age 15, txtd with PCN), who presents to hospital for symptomatic hypoglycemia likely 2/2 poor PO intake. At presentation, patient was found to be febrile to 102 and in hypoxic respiratory distress 2/2 + adenovirus URI

## 2019-04-27 LAB
CULTURE RESULTS: SIGNIFICANT CHANGE UP
CULTURE RESULTS: SIGNIFICANT CHANGE UP
ISLET CELL512 AB SER-ACNC: SIGNIFICANT CHANGE UP
SPECIMEN SOURCE: SIGNIFICANT CHANGE UP
SPECIMEN SOURCE: SIGNIFICANT CHANGE UP

## 2019-04-29 LAB
CULTURE RESULTS: SIGNIFICANT CHANGE UP
CULTURE RESULTS: SIGNIFICANT CHANGE UP
GAD65 AB SER-MCNC: 0 NMOL/L — SIGNIFICANT CHANGE UP
SPECIMEN SOURCE: SIGNIFICANT CHANGE UP
SPECIMEN SOURCE: SIGNIFICANT CHANGE UP

## 2019-06-03 ENCOUNTER — APPOINTMENT (OUTPATIENT)
Dept: ENDOCRINOLOGY | Facility: CLINIC | Age: 74
End: 2019-06-03
Payer: COMMERCIAL

## 2019-06-03 VITALS — HEIGHT: 62 IN | WEIGHT: 105 LBS | BODY MASS INDEX: 19.32 KG/M2

## 2019-06-03 PROCEDURE — G0108 DIAB MANAGE TRN  PER INDIV: CPT

## 2019-06-24 ENCOUNTER — APPOINTMENT (OUTPATIENT)
Dept: ENDOCRINOLOGY | Facility: CLINIC | Age: 74
End: 2019-06-24
Payer: COMMERCIAL

## 2019-06-24 VITALS
WEIGHT: 107 LBS | SYSTOLIC BLOOD PRESSURE: 120 MMHG | HEART RATE: 92 BPM | BODY MASS INDEX: 19.69 KG/M2 | OXYGEN SATURATION: 92 % | DIASTOLIC BLOOD PRESSURE: 66 MMHG | HEIGHT: 62 IN

## 2019-06-24 LAB
GLUCOSE BLDC GLUCOMTR-MCNC: 316
HBA1C MFR BLD HPLC: 10.8

## 2019-06-24 PROCEDURE — 82962 GLUCOSE BLOOD TEST: CPT

## 2019-06-24 PROCEDURE — 83036 HEMOGLOBIN GLYCOSYLATED A1C: CPT | Mod: QW

## 2019-06-24 PROCEDURE — 99205 OFFICE O/P NEW HI 60 MIN: CPT | Mod: 25

## 2019-06-24 RX ORDER — ETHAMBUTOL HYDROCHLORIDE 400 MG/1
400 TABLET ORAL
Qty: 30 | Refills: 0 | Status: ACTIVE | COMMUNITY
Start: 2019-04-11

## 2019-06-24 RX ORDER — AMIKACIN 590 MG/8.4ML
590 SUSPENSION RESPIRATORY (INHALATION)
Qty: 235 | Refills: 0 | Status: ACTIVE | COMMUNITY
Start: 2019-06-20

## 2019-06-27 LAB
CREAT SPEC-SCNC: 152 MG/DL
MICROALBUMIN 24H UR DL<=1MG/L-MCNC: 74.1 MG/DL
MICROALBUMIN/CREAT 24H UR-RTO: 488 MG/G

## 2019-06-30 NOTE — H&P ADULT - HISTORY OF PRESENT ILLNESS
Please follow up with your Primary care provider within 2-5 days if your signs and symptoms have not resolved or worsen.     If your condition worsens or fails to improve we recommend that you receive another evaluation at the emergency room immediately or contact your primary medical clinic to discuss your concerns.    You must understand that you have received an Urgent Care treatment only and that you may be released before all of your medical problems are known or treated.   You, the patient, will arrange for follow up care as instructed.     EYE     If your condition worsens or fails to improve we recommend that you receive another evaluation at the ER immediately or contact your PCP or Opthalmologist to discuss your concerns.    You must understand that you've received an urgent care treatment only and that you may be released before all your medical problems are known or treated. You the patient will arrange for followup care as instructed.     Use the eye drops as prescribed while awake initially. Use the eye drops as directed on bottle. Wash hands before and after using drops.  Avoid touching your eye.    Throw away any cosmetics that may have come in contact with your eye in the last 3 days prior to symptoms.    Do not wear your contact lens ( if you use them) for at least 5 days after you stop having symptoms and are rechecked by your doctor. Throw away the contacts, contact solution and carrying case you were using and start with new material.      Elevated Blood Pressure    Your blood pressure was elevated during your visit to the urgent care.     It was not so high that immediate care was needed but it is recommended that you monitor your blood pressure over the next week or two to make sure that it is not staying elevated.      Please have your blood pressure taken 2-3 times daily at different times of the day.  Write all of those blood pressures down and record the time that they were taken.     Keep  all that information and take it with you to see your Primary Care Physician.     If you are taking antihypertensives, please continue your medication regularly as prescribed.      If your blood pressure is consistently above 140/90 you will need to follow up with your PCP more quickly.     - According to ACEP guidelines, workup and treatment of hypertension in asymptomatic patient is not warranted in the ED:    (1) Initiating treatment for asymptomatic hypertension in the ED is not necessary when patients have follow-up.    (2) Rapidly lowering blood pressure in asymptomatic patients in the ED is unnecessary and may be harmful in some patients.    (3) When ED treatment for asymptomatic hypertension is initiated, blood pressure management should attempt to gradually lower blood pressure and should not be expected to be normalized during the initial ED visit.        Call your doctor immediately or seek emergency care if you have any of the following:  · Chest pain or shortness of breath (call 911)  · Moderate to severe headache  · Weakness in the muscles of your face, arms, or legs  · Trouble speaking  · Extreme drowsiness  · Confusion  · Fainting or dizziness  · Pulsating or rushing sound in your ears  · Unexplained nosebleed  · Weakness, tingling, or numbness of your face, arms, or legs  · Change in vision  · Blood pressure measured at home that is greater than 180/110     Please consider DASH diet program ([employer] Web site) for patients with hypertension as it is the only diet approved to improve blood pressures:     https://www.nhlbi.nih.gov/health-topics/dash-eating-plan      Particle Removed from Eye (Corneal Foreign Body)    A particle got into your eye and stuck to the cornea (the clear part in the front of the eye). Your healthcare provider has removed this particle. The cornea is very sensitive and may still hurt for another 1 to 2 days while it heals.  If a metal particle was in your  "eye, a "rust ring" may have formed. This may require a second visit for complete removal.  Your healthcare provider may have put an eye patch on your eye. This may help the healing process. But you also may not receive an eye patch. For several types of injuries to the cornea, they are not recommended.  Home care  · You may wear sunglasses to help reduce symptoms while the eye is healing, unless advised otherwise by your healthcare provider.  · You may use acetaminophen or ibuprofen to control pain, unless another pain medicine was prescribed. (Note: If you have chronic liver or kidney disease, or if you have ever had a stomach ulcer or gastrointestinal bleeding, talk with your healthcare provider before using these medicines.)  · If you received an eye patch:  ¨ It should not be left in place for more than 24 hours, unless advised otherwise by your healthcare provider.  ¨ Always keep your return appointment for patch removal and re-exam. Your eye could be harmed if the patch remains in place longer than advised.  ¨ Do not drive a motor vehicle or operate machinery with the patch in place. You will have trouble judging distances with only one eye.  ¨ If eye drops or ointment were prescribed, use as directed.  Follow-up care  · No eye patch: If no patch was used, but the pain continues for more than 48 hours, you should have another eye exam.  · Eye patch: If your eye was patched and you were given a return appointment for patch removal and re-exam, do not miss the visit. Again, it could be harmful to your eye if the patch remains in place longer than advised. However, if you were asked to remove the eye patch within 24 hours (or as directed by your healthcare provider), contact your healthcare provider right away if your pain increases or get worse after removal of the eye patch.  When to seek medical advice  Call your healthcare provider right away if any of these occur:  · Increasing eye pain or pain that does not " improve after 24 hours  · Discharge from the eye  · Redness of the eye or swelling of the eyelids  · Worsening vision  Date Last Reviewed: 2/24/2017 © 2000-2017 [employer]. [address] [place] [address], [place] [address]. All rights reserved. This information is not intended as a substitute for professional medical care. Always follow your healthcare professional's instructions.         The patient is a 73 yo male with a PMH of T2DM (on insulin, victoza and metformin), VILMA (dx 2018, on IV abx via PICC placed 04/19 at [facility]), TB (age 15, txtd with PCN), who presents to hospital after developing hypoglycemia at home. The patient reports that yesterday he went to bed around mid day and his daughter and wife called 911 when they could not arouse him. He was found to have a FSG of 27 and was given d50 and juice. He reports he felt back to normal and declined to go to the hospital however a few hours later he became lethargic again and was brought to the ED by EMS. In the ED he denies any active complaints. He reports he took his insulin victoza and metformin yesterday AM and then went to [facility] where he had pancakes sausages and eggs. For lunch he had a light meal of cereal with bananas. He reports that he administered his abx and then went to lie down to clear out of the living where his nieces and nephews were playing. He reports he was recently in [facility] for 10 days earlier this month due to a lung infection and had a PICC placed which he has been using to give himself abx at home. He follows closely with his pulm Dr Maurice. He is in the process of switching endocrinologist.     In the ED vitals signs: Temp 102, 84, 97/53, 19, 88% on room air. The patient was started on a D10 drip infusion in the ED which has been discontinued. The patient received IV amikacin, IV ertapenem, PO ethambutol and PO rifampin. The patient received acetaminophen and potassium. He was initially seen by MICU for hypoglycemia however not accepted as resolved. The patient is a 75 yo male with a PMH of T2DM (on insulin, victoza and metformin), VILMA (dx 2018, on IV abx via PICC placed 04/19 at [facility]), TB (age 15, txtd with PCN), who presents to hospital after developing hypoglycemia at home. The patient reports that yesterday he went to bed around mid day and his daughter and wife called 911 when they could not arouse him. He was found to have a FSG of 27 and was given d50 and juice. He reports he felt back to normal and declined to go to the hospital however a few hours later he became lethargic again and was brought to the ED by EMS. In the ED he denies any active complaints. He reports he took his insulin victoza and metformin yesterday AM and then went to [facility] where he had pancakes sausages and eggs. For lunch he had a light meal of cereal with bananas. He reports that he administered his abx and then went to lie down to clear out of the living where his nieces and nephews were playing. He reports he was recently in [facility] 4/9  - 4/17 for MAC and had a PICC placed which he has been using to give himself abx at home. He follows closely with his pulm Dr Maurice. He is in the process of switching endocrinologist.     In the ED vitals signs: Temp 102, 84, 97/53, 19, 88% on room air. The patient was started on a D10 drip infusion in the ED which has been discontinued. The patient received IV amikacin, IV ertapenem, PO ethambutol and PO rifampin. The patient received acetaminophen and potassium. He was initially seen by MICU for hypoglycemia however not accepted as resolved. The patient is a 75 yo male with a PMH of T2DM (on insulin, victoza and metformin), VILMA (dx 2018, on IV abx via PICC placed 04/19 at [facility]), TB (age 15, txtd with PCN), who presents to hospital after developing hypoglycemia at home. The patient reports that yesterday he went to bed around mid day and his daughter and wife called 911 when they could not arouse him. He was found to have a FSG of 27 and was given d50 and juice. He reports he felt back to normal and declined to go to the hospital however a few hours later he became lethargic again and was brought to the ED by EMS because of repeated hypoglycemia. In the ED he denies any active complaints. He reports he took his insulin victoza and metformin yesterday AM and then went to [facility] where he had pancakes sausages and eggs. For lunch he had a light meal of cereal with bananas. He reports that he administered his abx and then went to lie down to clear out of the living where his nieces and nephews were playing. He reports he was recently in [facility] 4/9  - 4/17 for MAC and had a PICC placed which he has been using to give himself abx at home. He follows closely with his pulm Dr Maurice. He is in the process of switching endocrinologist.     In the ED vitals signs: Temp 102, 84, 97/53, 19, 88% on room air. The patient was started on a D10 drip infusion in the ED which has been discontinued. The patient received IV amikacin, IV ertapenem, PO ethambutol and PO rifampin. The patient received acetaminophen and potassium. He was initially seen by MICU for hypoglycemia however not accepted as resolved.

## 2019-07-02 ENCOUNTER — CLINICAL ADVICE (OUTPATIENT)
Age: 74
End: 2019-07-02

## 2019-07-02 RX ORDER — PEN NEEDLE, DIABETIC 29 G X1/2"
31G X 8 MM NEEDLE, DISPOSABLE MISCELLANEOUS
Qty: 1 | Refills: 3 | Status: ACTIVE | COMMUNITY
Start: 2019-07-02 | End: 1900-01-01

## 2019-07-09 ENCOUNTER — APPOINTMENT (OUTPATIENT)
Dept: GASTROENTEROLOGY | Facility: CLINIC | Age: 74
End: 2019-07-09
Payer: COMMERCIAL

## 2019-07-12 ENCOUNTER — CLINICAL ADVICE (OUTPATIENT)
Age: 74
End: 2019-07-12

## 2019-07-16 ENCOUNTER — APPOINTMENT (OUTPATIENT)
Dept: GASTROENTEROLOGY | Facility: CLINIC | Age: 74
End: 2019-07-16
Payer: COMMERCIAL

## 2019-07-16 VITALS
WEIGHT: 105 LBS | HEART RATE: 91 BPM | HEIGHT: 62 IN | TEMPERATURE: 98 F | SYSTOLIC BLOOD PRESSURE: 100 MMHG | RESPIRATION RATE: 17 BRPM | OXYGEN SATURATION: 93 % | DIASTOLIC BLOOD PRESSURE: 65 MMHG | BODY MASS INDEX: 19.32 KG/M2

## 2019-07-16 PROCEDURE — 99214 OFFICE O/P EST MOD 30 MIN: CPT

## 2019-07-16 RX ORDER — LIRAGLUTIDE 6 MG/ML
18 INJECTION SUBCUTANEOUS
Refills: 0 | Status: DISCONTINUED | COMMUNITY
End: 2019-07-16

## 2019-07-16 RX ORDER — INSULIN DETEMIR 100 [IU]/ML
100 INJECTION, SOLUTION SUBCUTANEOUS
Refills: 0 | Status: DISCONTINUED | COMMUNITY
End: 2019-07-16

## 2019-07-16 RX ORDER — ASPIRIN 81 MG/1
81 TABLET, CHEWABLE ORAL
Refills: 0 | Status: DISCONTINUED | COMMUNITY
End: 2019-07-16

## 2019-07-16 NOTE — HISTORY OF PRESENT ILLNESS
[de-identified] : 74 year old man with m avium and anemia. he underwent an EGD with biopsy on 2/27/19.Biopsy is positive for H. pylori. He denies rectal bleeding, melena or hematemesis.

## 2019-07-27 RX ORDER — SIMVASTATIN 20 MG/1
20 TABLET, FILM COATED ORAL
Refills: 0 | Status: ACTIVE | COMMUNITY

## 2019-08-08 ENCOUNTER — APPOINTMENT (OUTPATIENT)
Dept: ENDOCRINOLOGY | Facility: CLINIC | Age: 74
End: 2019-08-08
Payer: COMMERCIAL

## 2019-08-08 VITALS
OXYGEN SATURATION: 96 % | SYSTOLIC BLOOD PRESSURE: 124 MMHG | DIASTOLIC BLOOD PRESSURE: 80 MMHG | BODY MASS INDEX: 19.2 KG/M2 | WEIGHT: 105 LBS | HEART RATE: 96 BPM

## 2019-08-08 LAB — HBA1C MFR BLD HPLC: 11.6

## 2019-08-08 PROCEDURE — 95251 CONT GLUC MNTR ANALYSIS I&R: CPT

## 2019-08-08 PROCEDURE — 95250 CONT GLUC MNTR PHYS/QHP EQP: CPT

## 2019-08-08 PROCEDURE — 83036 HEMOGLOBIN GLYCOSYLATED A1C: CPT | Mod: QW

## 2019-08-08 PROCEDURE — 99214 OFFICE O/P EST MOD 30 MIN: CPT | Mod: 25

## 2019-08-19 ENCOUNTER — RX RENEWAL (OUTPATIENT)
Age: 74
End: 2019-08-19

## 2019-08-26 ENCOUNTER — RX RENEWAL (OUTPATIENT)
Age: 74
End: 2019-08-26

## 2019-08-29 ENCOUNTER — APPOINTMENT (OUTPATIENT)
Dept: CHRONIC DISEASE MANAGEMENT | Facility: CLINIC | Age: 74
End: 2019-08-29

## 2019-09-03 ENCOUNTER — APPOINTMENT (OUTPATIENT)
Dept: GASTROENTEROLOGY | Facility: CLINIC | Age: 74
End: 2019-09-03
Payer: COMMERCIAL

## 2019-09-03 VITALS
HEART RATE: 90 BPM | OXYGEN SATURATION: 95 % | HEIGHT: 62 IN | DIASTOLIC BLOOD PRESSURE: 60 MMHG | TEMPERATURE: 99.8 F | WEIGHT: 104 LBS | BODY MASS INDEX: 19.14 KG/M2 | RESPIRATION RATE: 17 BRPM | SYSTOLIC BLOOD PRESSURE: 110 MMHG

## 2019-09-03 PROCEDURE — 99213 OFFICE O/P EST LOW 20 MIN: CPT

## 2019-09-03 RX ORDER — CLARITHROMYCIN 500 MG/1
500 TABLET, FILM COATED ORAL TWICE DAILY
Qty: 20 | Refills: 1 | Status: COMPLETED | COMMUNITY
Start: 2019-07-16 | End: 2019-09-03

## 2019-09-03 RX ORDER — AMOXICILLIN 500 MG/1
500 CAPSULE ORAL 3 TIMES DAILY
Qty: 30 | Refills: 0 | Status: COMPLETED | COMMUNITY
Start: 2019-07-16 | End: 2019-09-03

## 2019-09-03 NOTE — PHYSICAL EXAM
[General Appearance - Alert] : alert [General Appearance - In No Acute Distress] : in no acute distress [Neck Appearance] : the appearance of the neck was normal [Neck Cervical Mass (___cm)] : no neck mass was observed [Jugular Venous Distention Increased] : there was no jugular-venous distention [Thyroid Diffuse Enlargement] : the thyroid was not enlarged [Thyroid Nodule] : there were no palpable thyroid nodules [Auscultation Breath Sounds / Voice Sounds] : lungs were clear to auscultation bilaterally [Heart Rate And Rhythm] : heart rate was normal and rhythm regular [Heart Sounds] : normal S1 and S2 [Heart Sounds Gallop] : no gallops [Murmurs] : no murmurs [Heart Sounds Pericardial Friction Rub] : no pericardial rub [Bowel Sounds] : normal bowel sounds [Abdomen Soft] : soft [Abdomen Tenderness] : non-tender [] : no hepato-splenomegaly [Cervical Lymph Nodes Enlarged Posterior Bilaterally] : posterior cervical [Abdomen Mass (___ Cm)] : no abdominal mass palpated [Cervical Lymph Nodes Enlarged Anterior Bilaterally] : anterior cervical [Supraclavicular Lymph Nodes Enlarged Bilaterally] : supraclavicular [No CVA Tenderness] : no ~M costovertebral angle tenderness [No Spinal Tenderness] : no spinal tenderness

## 2019-09-03 NOTE — HISTORY OF PRESENT ILLNESS
[de-identified] : 74 year old man with h. pylori gastritis. he completed a 10 day course of treatment but developed nausea. His nausea has now resolved. He is also being treated for  M. Avium

## 2019-09-10 ENCOUNTER — APPOINTMENT (OUTPATIENT)
Dept: ENDOCRINOLOGY | Facility: CLINIC | Age: 74
End: 2019-09-10
Payer: COMMERCIAL

## 2019-09-10 PROCEDURE — G0108 DIAB MANAGE TRN  PER INDIV: CPT

## 2019-10-10 ENCOUNTER — LABORATORY RESULT (OUTPATIENT)
Age: 74
End: 2019-10-10

## 2019-10-10 ENCOUNTER — APPOINTMENT (OUTPATIENT)
Dept: GASTROENTEROLOGY | Facility: CLINIC | Age: 74
End: 2019-10-10
Payer: COMMERCIAL

## 2019-10-10 PROCEDURE — 99212 OFFICE O/P EST SF 10 MIN: CPT

## 2019-10-13 ENCOUNTER — MOBILE ON CALL (OUTPATIENT)
Age: 74
End: 2019-10-13
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
## 2019-10-31 ENCOUNTER — APPOINTMENT (OUTPATIENT)
Dept: ENDOCRINOLOGY | Facility: CLINIC | Age: 74
End: 2019-10-31
Payer: COMMERCIAL

## 2019-10-31 VITALS
SYSTOLIC BLOOD PRESSURE: 111 MMHG | WEIGHT: 104 LBS | BODY MASS INDEX: 19.02 KG/M2 | DIASTOLIC BLOOD PRESSURE: 60 MMHG | HEART RATE: 87 BPM | OXYGEN SATURATION: 95 %

## 2019-10-31 LAB
GLUCOSE BLDC GLUCOMTR-MCNC: 445
HBA1C MFR BLD HPLC: 12.3

## 2019-10-31 PROCEDURE — 82962 GLUCOSE BLOOD TEST: CPT

## 2019-10-31 PROCEDURE — 99215 OFFICE O/P EST HI 40 MIN: CPT | Mod: 25

## 2019-10-31 PROCEDURE — 83036 HEMOGLOBIN GLYCOSYLATED A1C: CPT | Mod: QW

## 2019-11-05 ENCOUNTER — APPOINTMENT (OUTPATIENT)
Dept: GASTROENTEROLOGY | Facility: CLINIC | Age: 74
End: 2019-11-05
Payer: COMMERCIAL

## 2019-11-05 VITALS
OXYGEN SATURATION: 96 % | HEART RATE: 86 BPM | BODY MASS INDEX: 19.32 KG/M2 | SYSTOLIC BLOOD PRESSURE: 115 MMHG | WEIGHT: 105 LBS | HEIGHT: 62 IN | TEMPERATURE: 98.7 F | DIASTOLIC BLOOD PRESSURE: 70 MMHG

## 2019-11-05 DIAGNOSIS — D50.8 OTHER IRON DEFICIENCY ANEMIAS: ICD-10-CM

## 2019-11-05 DIAGNOSIS — A04.8 OTHER SPECIFIED BACTERIAL INTESTINAL INFECTIONS: ICD-10-CM

## 2019-11-05 DIAGNOSIS — A31.0 PULMONARY MYCOBACTERIAL INFECTION: ICD-10-CM

## 2019-11-05 PROCEDURE — 99213 OFFICE O/P EST LOW 20 MIN: CPT

## 2019-11-05 NOTE — HISTORY OF PRESENT ILLNESS
[de-identified] : 74 year old man with  h. Pylori. He completed his course of treatment. H. Pylori breath test i positive. Patient does not wish to undergo retreatment at the present time. He felt extremely ill from the original antibiotics and is on medication for the M. Avium.

## 2019-11-25 ENCOUNTER — RX RENEWAL (OUTPATIENT)
Age: 74
End: 2019-11-25

## 2019-11-25 RX ORDER — OMEPRAZOLE 20 MG/1
20 CAPSULE, DELAYED RELEASE ORAL DAILY
Qty: 30 | Refills: 3 | Status: ACTIVE | COMMUNITY
Start: 2019-07-16 | End: 1900-01-01

## 2019-12-06 ENCOUNTER — RX RENEWAL (OUTPATIENT)
Age: 74
End: 2019-12-06

## 2020-03-10 ENCOUNTER — APPOINTMENT (OUTPATIENT)
Dept: ENDOCRINOLOGY | Facility: CLINIC | Age: 75
End: 2020-03-10
Payer: COMMERCIAL

## 2020-03-10 VITALS
WEIGHT: 102 LBS | BODY MASS INDEX: 18.66 KG/M2 | DIASTOLIC BLOOD PRESSURE: 68 MMHG | OXYGEN SATURATION: 96 % | HEART RATE: 81 BPM | SYSTOLIC BLOOD PRESSURE: 111 MMHG

## 2020-03-10 DIAGNOSIS — R35.8 XXOTHER POLYURIA: ICD-10-CM

## 2020-03-10 LAB — HBA1C MFR BLD HPLC: >14

## 2020-03-10 PROCEDURE — 95251 CONT GLUC MNTR ANALYSIS I&R: CPT

## 2020-03-10 PROCEDURE — 95250 CONT GLUC MNTR PHYS/QHP EQP: CPT

## 2020-03-10 PROCEDURE — 99215 OFFICE O/P EST HI 40 MIN: CPT | Mod: 25

## 2020-03-10 PROCEDURE — 83036 HEMOGLOBIN GLYCOSYLATED A1C: CPT | Mod: QW

## 2020-03-10 RX ORDER — SITAGLIPTIN 50 MG/1
50 TABLET, FILM COATED ORAL
Qty: 1 | Refills: 1 | Status: ACTIVE | COMMUNITY
Start: 2019-07-16 | End: 1900-01-01

## 2020-03-10 RX ORDER — FLASH GLUCOSE SENSOR
KIT MISCELLANEOUS
Qty: 6 | Refills: 3 | Status: ACTIVE | COMMUNITY
Start: 2019-06-03 | End: 1900-01-01

## 2020-03-10 RX ORDER — GLIMEPIRIDE 1 MG/1
1 TABLET ORAL DAILY
Qty: 90 | Refills: 3 | Status: COMPLETED | COMMUNITY
Start: 2019-08-08 | End: 2020-03-10

## 2020-03-10 RX ORDER — FLASH GLUCOSE SCANNING READER
EACH MISCELLANEOUS
Qty: 1 | Refills: 0 | Status: ACTIVE | COMMUNITY
Start: 2019-06-03 | End: 1900-01-01

## 2020-03-10 RX ORDER — METFORMIN HYDROCHLORIDE 500 MG/1
500 TABLET, COATED ORAL
Qty: 180 | Refills: 3 | Status: ACTIVE | COMMUNITY
Start: 1900-01-01 | End: 1900-01-01

## 2020-03-10 RX ORDER — PEN NEEDLE, DIABETIC 29 G X1/2"
32G X 4 MM NEEDLE, DISPOSABLE MISCELLANEOUS
Qty: 1 | Refills: 1 | Status: ACTIVE | COMMUNITY
Start: 2019-09-12 | End: 1900-01-01

## 2020-04-28 NOTE — ED PROVIDER NOTE - CRITICAL CARE PROVIDED
This requires RN triage.  Will forward to RN pool for further review with patient.    Jerald Fenton MD    additional history taking/consult w/ pt's family directly relating to pts condition/direct patient care (not related to procedure)/documentation/interpretation of diagnostic studies/consultation with other physicians

## 2020-05-01 ENCOUNTER — APPOINTMENT (OUTPATIENT)
Dept: ENDOCRINOLOGY | Facility: CLINIC | Age: 75
End: 2020-05-01

## 2020-05-05 ENCOUNTER — APPOINTMENT (OUTPATIENT)
Dept: GASTROENTEROLOGY | Facility: CLINIC | Age: 75
End: 2020-05-05

## 2020-06-18 ENCOUNTER — APPOINTMENT (OUTPATIENT)
Dept: ENDOCRINOLOGY | Facility: CLINIC | Age: 75
End: 2020-06-18
Payer: COMMERCIAL

## 2020-06-18 VITALS
OXYGEN SATURATION: 96 % | DIASTOLIC BLOOD PRESSURE: 40 MMHG | HEART RATE: 83 BPM | TEMPERATURE: 98.1 F | SYSTOLIC BLOOD PRESSURE: 86 MMHG

## 2020-06-18 DIAGNOSIS — E11.9 TYPE 2 DIABETES MELLITUS W/OUT COMPLICATIONS: ICD-10-CM

## 2020-06-18 DIAGNOSIS — I95.89 OTHER HYPOTENSION: ICD-10-CM

## 2020-06-18 DIAGNOSIS — E86.1 OTHER HYPOTENSION: ICD-10-CM

## 2020-06-18 DIAGNOSIS — Z86.39 PERSONAL HISTORY OF OTHER ENDOCRINE, NUTRITIONAL AND METABOLIC DISEASE: ICD-10-CM

## 2020-06-18 LAB — HBA1C MFR BLD HPLC: 13.3

## 2020-06-18 PROCEDURE — 99215 OFFICE O/P EST HI 40 MIN: CPT | Mod: 25

## 2020-06-18 PROCEDURE — 83036 HEMOGLOBIN GLYCOSYLATED A1C: CPT | Mod: QW

## 2020-06-20 PROBLEM — E11.9 TYPE 2 DIABETES MELLITUS: Status: ACTIVE | Noted: 2019-06-03

## 2020-06-20 PROBLEM — I95.89 HYPOTENSION DUE TO HYPOVOLEMIA: Status: ACTIVE | Noted: 2020-06-20

## 2020-06-20 PROBLEM — Z86.39 HISTORY OF HYPERLIPIDEMIA: Status: ACTIVE | Noted: 2019-06-24

## 2020-06-20 PROBLEM — Z86.39 H/O HYPOGLYCEMIA: Status: ACTIVE | Noted: 2020-03-10

## 2020-06-20 NOTE — ASSESSMENT
[FreeTextEntry1] : 75 year old male with uncontrolled T2DM with hyperglycemia and h/o severe hypoglycemia. 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 He also has hyperlipidemia, chronic VILMA on antibiotic and now with urinary catheter and enlarged prostate, was told he is terminal. He is on hospice at home.\par \par T2DM\par - A1c 13.3% today. Very high risk for CVA and MI and worsening lung VILMA infection and sepsis.\par - Goal A1c is 8%. FS goal is . Inserted home use Pia device and showed daughter how to use\par - I discussed the importance of avoiding mild hypoglycemia (FS<100 mg/dl) and severe hypoglycemia (FS<85 mg/dl) with the patient. I also discussed hypoglycemia correction with 4 oz of juice or 4 glucose tablets and rechecking FS in 15 minutes. If FS is still low, repeat 4oz juice or 4 glucose tablets and consume 12 grams of carbohydrates.\par - Stop Metformin and Januvia, will stop orals for now\par - Recommend start Levemir 6 units QD\par - Recommend increase Novolog to 4 units TID AC and use SSI (1 unit for every 50>250) before meals\par - Recommend stop drinking juice on a regular basis. Recommend stop all canned fruits\par - I went through a lot of counselling again today with patient and spouse, spend 60 minutes of my time in counselling regarding food options for dinner instead of his carb meal. Also discussed importance of checking glucose and adhering to insulin.\par - He has nephropathy and microalbuminuria in June 2019, unable to discuss ACEi or ARB with him as he is frequently not adherent to diet and insulin and majority of the visit go into patient counselling. Would also not consider ACE or ARB given hypotension at this time\par \par Hyperlipidemia\par - Continue Simvastatin 20 mg QD\par \par Hypotension\par - Advised to drink Glucerna if he is not eating meals\par - Provided him with water so he is well hydrated\par \par \par I went through a lot of counselling again today with patient, spouse, and daughter, spend 60 minutes of my time in counselling regarding food options for dinner instead of his carb meal. Also discussed importance of checking glucose and adhering to insulin. Wife also admits that she does not like to cook so they frequently order outside foods. Patient is now on Hospice so goals are comfort care with medical treatment\par \par F/u via telehealth in 10 days, 20 days, 30 days and f/u in office in 2 months with me\par D/w daughter to make all appointments\par \par Cheyenne Jacques, \par \par  [Carbohydrate Consistent Diet] : carbohydrate consistent diet [Long Term Vascular Complications] : long term vascular complications of diabetes [Diabetes Foot Care] : diabetes foot care [Importance of Diet and Exercise] : importance of diet and exercise to improve glycemic control, achieve weight loss and improve cardiovascular health [Action and use of Insulin] : action and use of short and long-acting insulin [Hypoglycemia Management] : hypoglycemia management [Sick-Day Management] : sick-day management [Self Monitoring of Blood Glucose] : self monitoring of blood glucose [Retinopathy Screening] : Patient was referred to ophthalmology for retinopathy screening [Injection Technique, Storage, Sharps Disposal] : injection technique, storage, and sharps disposal

## 2020-06-20 NOTE — REVIEW OF SYSTEMS
[Decreased Appetite] : decreased appetite [Recent Weight Loss (___ Lbs)] : recent weight loss: [unfilled] lbs [Fatigue] : fatigue [Chills] : chills [SOB on Exertion] : shortness of breath on exertion [Shortness Of Breath] : shortness of breath [Wheezing] : wheezing [Nocturia] : nocturia [Polyuria] : polyuria [Dysuria] : dysuria [Muscle Weakness] : muscle weakness [Poor Balance] : poor balance [Dizziness] : dizziness [Difficulty Walking] : difficulty walking [Polydipsia] : polydipsia [All other systems negative] : All other systems negative [Negative] : Heme/Lymph

## 2020-06-20 NOTE — DATA REVIEWED
[FreeTextEntry1] : Spoke with Dr. Conklin on 07/10 regarding long term mycobacterial infection with no optimal control of lung infection given hyperglycemia\par \par 03/27/19:\par Extensive pulm fibrosis. Findings of chronic pancreatitis.

## 2020-06-20 NOTE — PHYSICAL EXAM
[Alert] : alert [Normal Sclera/Conjunctiva] : normal sclera/conjunctiva [No Acute Distress] : no acute distress [EOMI] : extra ocular movement intact [No Proptosis] : no proptosis [Normal Outer Ear/Nose] : the ears and nose were normal in appearance [Thyroid Not Enlarged] : the thyroid was not enlarged [No Thyroid Nodules] : no palpable thyroid nodules [Normal Hearing] : hearing was normal [Normal Rate and Effort] : normal respiratory rate and effort [Normal Rate] : heart rate was normal [Regular Rhythm] : with a regular rhythm [Normal S1, S2] : normal S1 and S2 [No Edema] : no peripheral edema [Pedal Pulses Normal] : the pedal pulses are present [Normal Bowel Sounds] : normal bowel sounds [Not Distended] : not distended [Soft] : abdomen soft [Not Tender] : non-tender [No Clubbing, Cyanosis] : no clubbing  or cyanosis of the fingernails [No Stigmata of Cushings Syndrome] : no stigmata of Cushings Syndrome [No Rash] : no rash [No Involuntary Movements] : no involuntary movements were seen [Acanthosis Nigricans] : no acanthosis nigricans [No Tremors] : no tremors [Oriented x3] : oriented to person, place, and time [Normal Mood] : the mood was normal [Normal Affect] : the affect was normal [de-identified] : thin male, appears malnourished and weak [de-identified] : mild conversational dyspnea, increased resp effort, dry rales in lung bases.

## 2020-06-20 NOTE — HISTORY OF PRESENT ILLNESS
[FreeTextEntry1] : 75 year old male here for f/u management of T2DM (A1c 13.3% today). Patient is here with his wife and his daughter, Gabi. Patient comes in a wheelchair today. \par \par Hosp: 04/22 - 04/29 for hypoglycemia x 2. FS was 28 upon arrival to the ED and he was on Metformin 500 mg QD, Victoza 1.8 mg QD, and Tresiba 22 units QHS. While inpatient, he was given Lantus 13 units QHS x 2 doses and then FS was in the 40s, so he was discharged on no insulin. Additional w/u was ACTH 13.7, AM Cortisol 15.0, DHEA-S 37.7, TSH 1.39, and C-peptide 2.4, CHANELL Ab 0.00 and Islet Cell Ab <1:4 (04/2019). \par \par T2DM was diagnosed in 2009\par Complications include: No neuropathy, no retinopathy (last ophtho was Feb 2020), no nephropathy. No MI or CVA. \par Hospitalizations: 2 hospital visits at [facility] x 1 week each time for lab abnormalities and uncontrolled T2DM in April-May 2020. He also has a urinary catheter for enlarged prostate and urine infection. He is on a new antibiotic for UTI. He was told he is terminal. Needed prostate biopsy but not pursued given his frail state and significant lung disease. Lung disease medications have been changed after hospital visits. Patient is now with hospice at home.\par \[facility] Has aide coming every morning from 9am-1pm. RN will be coming in once/week. \par Current DM Medications/Insulin: Humalog 2/2/2 TID AC. Also on Januvia. Had Metformin in the past. Not on any long-acting insulin.\par \par Brings meter and random FS are 300-450s\par \par Admits to poor appetite. \par B= eggs or quiroz or sausage or piece of toast. No coffee. \par L= sandwich and cranberry juice (ham and cheese and salami). Sometimes peanut butter and regular jelly\par D = meat, starch (pasta or rice - 2 tablespoons), and vegetable\par Drink - Mostly water. Occ soda. \par Ice-cream (if he could he would)\par Fruits - bananas, strawberry, peaches, canned fruit (pears, peaches, pineapple)\par \par Additional history:\par PMH: VILMA (dx 2018 on PICC Abx and continues to be on oral antibiotics for it), TB (age 15)\par ethambutol, trelegy 100-62.5, simvastatin.\par \par ROS: + polyuria and polydipsia. Admits to nocturia every 45 minuts. No falls. Admits to weight loss. \par \par For HLD, taking Simvastatin 20 mg QD

## 2020-06-23 RX ORDER — INSULIN DETEMIR 100 [IU]/ML
100 INJECTION, SOLUTION SUBCUTANEOUS
Qty: 3 | Refills: 2 | Status: ACTIVE | COMMUNITY
Start: 2019-09-12 | End: 1900-01-01

## 2020-09-03 ENCOUNTER — RX RENEWAL (OUTPATIENT)
Age: 75
End: 2020-09-03

## 2020-10-16 RX ORDER — INSULIN LISPRO 100 [IU]/ML
100 INJECTION, SOLUTION INTRAVENOUS; SUBCUTANEOUS
Qty: 3 | Refills: 3 | Status: ACTIVE | COMMUNITY
Start: 2019-07-02 | End: 1900-01-01

## 2020-12-16 PROBLEM — Z12.11 ENCOUNTER FOR SCREENING COLONOSCOPY: Status: RESOLVED | Noted: 2018-10-09 | Resolved: 2020-12-16

## 2021-08-17 NOTE — CONSULT NOTE ADULT - CONSULT REQUESTED BY NAME
Ongoing SW/CM Assessment/Plan of Care Note     See SW/CM flowsheets for goals and other objective data.    Patient/Family discharge goal (s):  Goal #1: Psychosocial needs assessed                 Disposition:     Home with family   Progress note:   Discussed dc planning with patient [facility]. States she will have 24/7 hour assist with daughter and sister         
Dr. Willson
ED Gold
family
Mateusz

## 2021-11-29 NOTE — H&P ADULT - NSHPOUTPATIENTPROVIDERS_GEN_ALL_CORE
PCP Dr Funmi Rodarte  Pulm Dr Conklin PCP JEANNINE Barrett  Pulm Dr Conklin Hemostasis: Electrodesiccation

## 2022-04-14 NOTE — ED PROVIDER NOTE - PMH
Since his blood pressures over 130/80 I would recommend increasing his lisinopril to 20 mg daily if you like to try that by taking 2 of the 10 mg tablets daily for the next week or 2.     No pertinent past medical history <<----- Click to add NO pertinent Past Medical History Mycobacterium avium-intracellulare complex    Type 2 diabetes mellitus with complication, without long-term current use of insulin

## 2022-10-08 NOTE — CONSULT NOTE ADULT - ASSESSMENT
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  He reports he was recently in [facility] 4/9  - 4/17 for VILMA, here with resp distress/hypoxia    1. anemia -- pt was given IV Fe in office in the past.   -- labs adequate from office visit  -- hgb stable and adequate, monitor for now  -- transfuse for hgb <7  -- Fe elevated in office    2. VILMA -- per ID    3. hypoxia -- per primary team    Plan and impression d/w pt. Pt to f/u with Dr Gaming after d/c. [facility] [place] call with questions, [phone] No

## 2023-07-21 NOTE — PHYSICAL THERAPY INITIAL EVALUATION ADULT - NAME OF DISCHARGE PLANNER
-- DO NOT REPLY / DO NOT REPLY ALL --  -- Message is from Engagement Center Operations (ECO) --    Offered Waitlist if Available for the Visit Type? No    Caller is requesting an appointment - at a sooner time than what was available.      Caller wants sooner appointment - offered other approved options    Reason for Visit: New patient ; Ultrasound results     Is the patient currently scheduled? No    Preferred time to be seen:     Caller Information       Type Contact Phone/Fax    07/21/2023 09:59 AM CDT Phone (Incoming) Herminia Penaloza (Self) [phone] ()          Alternative phone number: na    Can a detailed message be left? Yes    Message Turnaround: [place]:    Refer to site's KB page for routing instructions    Please give this turnaround time to the caller:   \"You can expect to receive a response 1-3 business days after your provider's clinical team reviews the message\"   Diane, CM

## 2024-07-06 NOTE — H&P ADULT - PROBLEM SELECTOR PLAN 2
-Initially presented with hypoglycemia in setting of decreased PO intake at lunch time after taking insulin, victoza and metformin in the AM.  ED spoke w pts endoc Dr. Jacobs, who recommending decreased tresiba dose from 20u to 16u -Dx 2018. On multiple abx in past. Current regiment: Amikacin, Ertapenem, Ethambutol, Levaquin, Rifampin. PICC placed during admission earlier this month at [facility]. Spiked fever in ED to 102 without other SIRS criteria. May be 2/2 to MAC however patient also RVP+ for adenovirus. Will contact patients pulm Dr Katerin MERCHANT to discuss further care (ED spoke to Katerin 4/22)  -bcx sent to r/o bacteremia in setting of fevers and recent PICC line placement  -will contact pulm Dr Katerin MERCHANT to clarify abx dosage and duration. -Dx 2018. On multiple abx in past. Current regiment: Amikacin, Ertapenem, Ethambutol, Levaquin, Rifampin. PICC placed during admission earlier this month at [facility]. Spiked fever in ED to 102 without other SIRS criteria. May be 2/2 to MAC however patient also RVP+ for adenovirus. Will contact patients pulm Dr Katerin MERCHANT to discuss further care (ED spoke to Katerin 4/22)  -bcx sent to r/o bacteremia in setting of fevers and recent PICC line placement  -Please consult ID in AM to clarify duration and continuation of antimicrobial therapy. no -Dx 2018. On multiple abx in past. Current regiment: Amikacin, Ertapenem, Ethambutol, Levaquin, Rifampin. PICC placed during admission earlier this month at [facility]. Spiked fever in ED to 102 without other SIRS criteria. May be 2/2 to MAC however patient also RVP+ for adenovirus. Will contact patients pulm Dr Katerin MERCHANT to discuss further care (ED spoke to Katerin 4/22)  -bcx sent to r/o bacteremia in setting of fevers and recent PICC line placement  -Please f/u ID in AM  -Please obtain records of culture and sensitivity of MAC so appropriate therapy can be restarted.